# Patient Record
Sex: FEMALE | Race: WHITE
[De-identification: names, ages, dates, MRNs, and addresses within clinical notes are randomized per-mention and may not be internally consistent; named-entity substitution may affect disease eponyms.]

---

## 2018-01-10 ENCOUNTER — HOSPITAL ENCOUNTER (OUTPATIENT)
Dept: HOSPITAL 17 - NEPE | Age: 83
Setting detail: OBSERVATION
LOS: 3 days | Discharge: HOME | End: 2018-01-13
Attending: HOSPITALIST | Admitting: HOSPITALIST
Payer: MEDICARE

## 2018-01-10 VITALS — WEIGHT: 150.31 LBS | BODY MASS INDEX: 29.51 KG/M2 | HEIGHT: 60 IN

## 2018-01-10 VITALS
HEART RATE: 72 BPM | OXYGEN SATURATION: 96 % | SYSTOLIC BLOOD PRESSURE: 152 MMHG | RESPIRATION RATE: 18 BRPM | TEMPERATURE: 98 F | DIASTOLIC BLOOD PRESSURE: 69 MMHG

## 2018-01-10 VITALS
OXYGEN SATURATION: 94 % | TEMPERATURE: 99.3 F | DIASTOLIC BLOOD PRESSURE: 82 MMHG | RESPIRATION RATE: 18 BRPM | HEART RATE: 101 BPM | SYSTOLIC BLOOD PRESSURE: 140 MMHG

## 2018-01-10 VITALS
HEART RATE: 78 BPM | SYSTOLIC BLOOD PRESSURE: 147 MMHG | OXYGEN SATURATION: 99 % | DIASTOLIC BLOOD PRESSURE: 78 MMHG | RESPIRATION RATE: 18 BRPM

## 2018-01-10 DIAGNOSIS — F32.9: ICD-10-CM

## 2018-01-10 DIAGNOSIS — R56.9: ICD-10-CM

## 2018-01-10 DIAGNOSIS — N39.0: ICD-10-CM

## 2018-01-10 DIAGNOSIS — E78.00: ICD-10-CM

## 2018-01-10 DIAGNOSIS — R94.31: ICD-10-CM

## 2018-01-10 DIAGNOSIS — M46.90: ICD-10-CM

## 2018-01-10 DIAGNOSIS — Z85.42: ICD-10-CM

## 2018-01-10 DIAGNOSIS — Z79.899: ICD-10-CM

## 2018-01-10 DIAGNOSIS — R00.2: ICD-10-CM

## 2018-01-10 DIAGNOSIS — B96.89: ICD-10-CM

## 2018-01-10 DIAGNOSIS — Z79.01: ICD-10-CM

## 2018-01-10 DIAGNOSIS — G45.9: Primary | ICD-10-CM

## 2018-01-10 DIAGNOSIS — I45.10: ICD-10-CM

## 2018-01-10 DIAGNOSIS — F41.9: ICD-10-CM

## 2018-01-10 DIAGNOSIS — M81.0: ICD-10-CM

## 2018-01-10 DIAGNOSIS — Z86.73: ICD-10-CM

## 2018-01-10 DIAGNOSIS — K21.9: ICD-10-CM

## 2018-01-10 LAB
ALBUMIN SERPL-MCNC: 3.5 GM/DL (ref 3.4–5)
ALP SERPL-CCNC: 74 U/L (ref 45–117)
ALT SERPL-CCNC: 25 U/L (ref 10–53)
AST SERPL-CCNC: 20 U/L (ref 15–37)
BACTERIA #/AREA URNS HPF: (no result) /HPF
BASOPHILS # BLD AUTO: 0.1 TH/MM3 (ref 0–0.2)
BASOPHILS NFR BLD: 0.7 % (ref 0–2)
BILIRUB SERPL-MCNC: 0.3 MG/DL (ref 0.2–1)
BUN SERPL-MCNC: 17 MG/DL (ref 7–18)
CALCIUM SERPL-MCNC: 8.6 MG/DL (ref 8.5–10.1)
CHLORIDE SERPL-SCNC: 104 MEQ/L (ref 98–107)
COLOR UR: YELLOW
CREAT SERPL-MCNC: 0.75 MG/DL (ref 0.5–1)
EOSINOPHIL # BLD: 0.1 TH/MM3 (ref 0–0.4)
EOSINOPHIL NFR BLD: 1.2 % (ref 0–4)
ERYTHROCYTE [DISTWIDTH] IN BLOOD BY AUTOMATED COUNT: 13.7 % (ref 11.6–17.2)
GFR SERPLBLD BASED ON 1.73 SQ M-ARVRAT: 74 ML/MIN (ref 89–?)
GLUCOSE SERPL-MCNC: 99 MG/DL (ref 74–106)
GLUCOSE UR STRIP-MCNC: (no result) MG/DL
HCO3 BLD-SCNC: 29.2 MEQ/L (ref 21–32)
HCT VFR BLD CALC: 41.3 % (ref 35–46)
HGB BLD-MCNC: 14 GM/DL (ref 11.6–15.3)
HGB UR QL STRIP: (no result)
INR PPP: 1.6 RATIO
KETONES UR STRIP-MCNC: (no result) MG/DL
LYMPHOCYTES # BLD AUTO: 1.3 TH/MM3 (ref 1–4.8)
LYMPHOCYTES NFR BLD AUTO: 16.2 % (ref 9–44)
MCH RBC QN AUTO: 30 PG (ref 27–34)
MCHC RBC AUTO-ENTMCNC: 34 % (ref 32–36)
MCV RBC AUTO: 88.4 FL (ref 80–100)
MONOCYTE #: 0.8 TH/MM3 (ref 0–0.9)
MONOCYTES NFR BLD: 10.3 % (ref 0–8)
NEUTROPHILS # BLD AUTO: 5.6 TH/MM3 (ref 1.8–7.7)
NEUTROPHILS NFR BLD AUTO: 71.6 % (ref 16–70)
NITRITE UR QL STRIP: (no result)
PLATELET # BLD: 229 TH/MM3 (ref 150–450)
PMV BLD AUTO: 7.7 FL (ref 7–11)
PROT SERPL-MCNC: 7 GM/DL (ref 6.4–8.2)
PROTHROMBIN TIME: 16.5 SEC (ref 9.8–11.6)
RBC # BLD AUTO: 4.67 MIL/MM3 (ref 4–5.3)
RENAL EPI CELLS #/AREA URNS HPF: 1 /HPF
SODIUM SERPL-SCNC: 140 MEQ/L (ref 136–145)
SP GR UR STRIP: 1.01 (ref 1–1.03)
SQUAMOUS #/AREA URNS HPF: 4 /HPF (ref 0–5)
TROPONIN I SERPL-MCNC: (no result) NG/ML (ref 0.02–0.05)
URINE LEUKOCYTE ESTERASE: (no result)
WBC # BLD AUTO: 7.8 TH/MM3 (ref 4–11)

## 2018-01-10 PROCEDURE — 96365 THER/PROPH/DIAG IV INF INIT: CPT

## 2018-01-10 PROCEDURE — 85610 PROTHROMBIN TIME: CPT

## 2018-01-10 PROCEDURE — 84439 ASSAY OF FREE THYROXINE: CPT

## 2018-01-10 PROCEDURE — 85730 THROMBOPLASTIN TIME PARTIAL: CPT

## 2018-01-10 PROCEDURE — 70544 MR ANGIOGRAPHY HEAD W/O DYE: CPT

## 2018-01-10 PROCEDURE — 92522 EVALUATE SPEECH PRODUCTION: CPT

## 2018-01-10 PROCEDURE — 96372 THER/PROPH/DIAG INJ SC/IM: CPT

## 2018-01-10 PROCEDURE — 84443 ASSAY THYROID STIM HORMONE: CPT

## 2018-01-10 PROCEDURE — 82948 REAGENT STRIP/BLOOD GLUCOSE: CPT

## 2018-01-10 PROCEDURE — 80053 COMPREHEN METABOLIC PANEL: CPT

## 2018-01-10 PROCEDURE — 83036 HEMOGLOBIN GLYCOSYLATED A1C: CPT

## 2018-01-10 PROCEDURE — 70553 MRI BRAIN STEM W/O & W/DYE: CPT

## 2018-01-10 PROCEDURE — 87086 URINE CULTURE/COLONY COUNT: CPT

## 2018-01-10 PROCEDURE — 99285 EMERGENCY DEPT VISIT HI MDM: CPT

## 2018-01-10 PROCEDURE — 85652 RBC SED RATE AUTOMATED: CPT

## 2018-01-10 PROCEDURE — 97165 OT EVAL LOW COMPLEX 30 MIN: CPT

## 2018-01-10 PROCEDURE — 84425 ASSAY OF VITAMIN B-1: CPT

## 2018-01-10 PROCEDURE — 84484 ASSAY OF TROPONIN QUANT: CPT

## 2018-01-10 PROCEDURE — 84207 ASSAY OF VITAMIN B-6: CPT

## 2018-01-10 PROCEDURE — 97116 GAIT TRAINING THERAPY: CPT

## 2018-01-10 PROCEDURE — 95819 EEG AWAKE AND ASLEEP: CPT

## 2018-01-10 PROCEDURE — A9579 GAD-BASE MR CONTRAST NOS,1ML: HCPCS

## 2018-01-10 PROCEDURE — 80061 LIPID PANEL: CPT

## 2018-01-10 PROCEDURE — 93005 ELECTROCARDIOGRAM TRACING: CPT

## 2018-01-10 PROCEDURE — 70450 CT HEAD/BRAIN W/O DYE: CPT

## 2018-01-10 PROCEDURE — 86038 ANTINUCLEAR ANTIBODIES: CPT

## 2018-01-10 PROCEDURE — 85025 COMPLETE CBC W/AUTO DIFF WBC: CPT

## 2018-01-10 PROCEDURE — 97110 THERAPEUTIC EXERCISES: CPT

## 2018-01-10 PROCEDURE — 81001 URINALYSIS AUTO W/SCOPE: CPT

## 2018-01-10 PROCEDURE — G0378 HOSPITAL OBSERVATION PER HR: HCPCS

## 2018-01-10 PROCEDURE — 93306 TTE W/DOPPLER COMPLETE: CPT

## 2018-01-10 PROCEDURE — 86039 ANTINUCLEAR ANTIBODIES (ANA): CPT

## 2018-01-10 PROCEDURE — 93880 EXTRACRANIAL BILAT STUDY: CPT

## 2018-01-10 PROCEDURE — 97163 PT EVAL HIGH COMPLEX 45 MIN: CPT

## 2018-01-10 NOTE — PD
HPI


Chief Complaint:  Neuro Symptoms/ Deficits


Time Seen by Provider:  19:22


Travel History


International Travel<30 days:  No


Contact w/Intl Traveler<30days:  No


Traveled to known affect area:  No





History of Present Illness


HPI


82-year-old female that presents to the ED for evaluation of possible TIA.  

Patient has a history of TIAs in the past.  Per patient she last had one in 

.  Per patient she's noted as well as daughter that she's been having 

episodes of confusion as well as trouble speaking and coming up with words to 

come and go.  She's had a couple of episodes after Christmases but they noted 

them more yesterday and today when she apparently tried to text her daughter 

and she couldn't finish the text as well as today when she was playing "

solitary "on her phone and she couldn't figure out how to play it at that she 

plays this daily and at the same time could not speak with his daughter.  She 

does report that on December the.  She had an episode which she was reading a 

book and on one of her eye she could not read the letter and only part of it.  

Per patient she is compliant with her Coumadin which she is being prescribed to 

her doctor.  She does have a history of high blood pressure.  She denies any 

history of diabetes.  No chest pain or shortness of breath.  No urinary or 

bowel movement issues.  She has allergies to quinine.  She follows with Dr. Strange for neurology but she couldn't get to talk to him on the phone so they 

came here.





PFSH


Past Medical History


Arthritis:  Yes (LOW SPINAL ARTHRITIS)


Anxiety:  Yes


Depression:  Yes


Cancer:  Yes (ENDOMETRIUM  RADIATION REQ'D NO CHEMO)


Cardiovascular Problems:  Yes (PALPITATIONS IF UNDER EXTREME STRESS )


Diabetes:  No


Endocrine:  No


Gastrointestinal Disorders:  Yes (OCCAS ACID REFLUX; HX GI BLEED  )


Genitourinary:  No


Hepatitis:  No


Hiatal Hernia:  No


Immune Disorder:  No


Implanted Vascular Access Dvce:  Yes


Musculoskeletal:  Yes (MULTIPLE PELVIC STRESS FRACTURES, [id number] )


Neurologic:  Yes (GRAND MAL SEIZURE 2014 ? FROM DEHYDRATION OR ANXIETY )


Psychiatric:  Yes (ANXIETY )


Reproductive:  Yes (HX OF ENDOMETRIAL CANCER)


Respiratory:  No


Radiation Therapy:  Yes


Thyroid Disease:  No


Tetanus Vaccination:  > 5 Years


Menopausal:  No


Tubal Ligation:  Yes ()





Past Surgical History


Abdominal Surgery:  Yes (CYST ON LIVER DRAINED; DILAN  )


AICD:  No


Appendectomy:  Yes ()


Body Medical Devices:  1 SURGICAL WIRE SUTURE - JAW


Cardiac Surgery:  No


 Section:  Yes ()


Ear Surgery:  No


Endocrine Surgery:  No


Eye Surgery:  Yes (CATARACT RIGHT )


Genitourinary Surgery:  No


Gynecologic Surgery:  Yes (TOTAL HYSTERECTOMY; C SECTION )


Hysterectomy:  Yes ()


Joint Replacement:  No


Oral Surgery:  Yes (JAW SURGERY-SEVERE UNDERBITE)


Pacemaker:  No


Thoracic Surgery:  No


Other Surgery:  Yes (JAW SURGERIES IN 1960S)





Social History


Alcohol Use:  No


Tobacco Use:  No


Substance Use:  No





Allergies-Medications


(Allergen,Severity, Reaction):  


Coded Allergies:  


     quinine (Unverified  Allergy, Severe, 8/15/17)


 FACIAL BUTTERFLY RASH


Reported Meds & Prescriptions





Reported Meds & Active Scripts


Active


Reported


Warfarin 2 Mg Tab 2 Mg PO DAILY


Multiple Vitamin/Minerals (Multiple Vitamins W/ Minerals) 1 Tab Tab   


Clonazepam 0.5 Mg Tab 0.5 Mg PO HS


Pravastatin 20 Mg Tab 20 Mg PO DAILY


Duloxetine DR (Duloxetine HCl) 30 Mg Capdr 30 Mg PO DAILY


Acetaminophen Extra Strength Liq (Acetaminophen) 500 Mg/15 Ml Soln 500 Mg PO Q4-

6H PRN








Review of Systems


Except as stated in HPI:  all other systems reviewed are Neg





Physical Exam


Narrative


GENERAL: 


SKIN: Warm and dry.


HEAD: Atraumatic. Normocephalic. 


EYES: Pupils equal and round. No scleral icterus. No injection or drainage. 


ENT: No nasal bleeding or discharge.  Mucous membranes pink and moist.  Tongue 

is midline.  No uvula deviation.


NECK: Trachea midline. No JVD. 


CARDIOVASCULAR: Regular rate and rhythm.  No murmurs, S3, S4.


RESPIRATORY: No accessory muscle use. Clear to auscultation. Breath sounds 

equal bilaterally. 


GASTROINTESTINAL: Abdomen soft, non-tender, nondistended. Hepatic and splenic 

margins not palpable. 


MUSCULOSKELETAL: Extremities without clubbing, cyanosis, or edema. No obvious 

deformities.  Full range of motion of the upper and lower extremities 

bilaterally.  2+ pulses bilaterally.  Romberg is negative.  pronator test is 

negative.


NEUROLOGICAL: Awake and alert. No obvious cranial nerve deficits.  Motor 

grossly within normal limits. Five out of 5 muscle strength in the arms and 

legs.  Normal speech.


PSYCHIATRIC: Appropriate mood and affect; insight and judgment normal.





Data


Data


Last Documented VS





Vital Signs








  Date Time  Temp Pulse Resp B/P (MAP) Pulse Ox O2 Delivery O2 Flow Rate FiO2


 


1/10/18 19:21     96 Room Air  


 


1/10/18 14:36 99.3 101 18 140/82 (101)    








Orders





 Orders


Electrocardiogram (1/10/18 15:00)


Prothrombin Time / Inr (Pt) (1/10/18 15:00)


Act Partial Throm Time (Ptt) (1/10/18 15:00)


Complete Blood Count With Diff (1/10/18 15:00)


Comprehensive Metabolic Panel (1/10/18 15:00)


Troponin I (1/10/18 15:00)


Urinalysis - C+S If Indicated (1/10/18 15:00)


Ct Brain W/O Iv Contrast(Rout) (1/10/18 15:00)


Urine Culture (1/10/18 15:35)


Admit Order (Ed Use Only) (1/10/18 20:28)





Labs





Laboratory Tests








Test


  1/10/18


15:35


 


White Blood Count 7.8 TH/MM3 


 


Red Blood Count 4.67 MIL/MM3 


 


Hemoglobin 14.0 GM/DL 


 


Hematocrit 41.3 % 


 


Mean Corpuscular Volume 88.4 FL 


 


Mean Corpuscular Hemoglobin 30.0 PG 


 


Mean Corpuscular Hemoglobin


Concent 34.0 % 


 


 


Red Cell Distribution Width 13.7 % 


 


Platelet Count 229 TH/MM3 


 


Mean Platelet Volume 7.7 FL 


 


Neutrophils (%) (Auto) 71.6 % 


 


Lymphocytes (%) (Auto) 16.2 % 


 


Monocytes (%) (Auto) 10.3 % 


 


Eosinophils (%) (Auto) 1.2 % 


 


Basophils (%) (Auto) 0.7 % 


 


Neutrophils # (Auto) 5.6 TH/MM3 


 


Lymphocytes # (Auto) 1.3 TH/MM3 


 


Monocytes # (Auto) 0.8 TH/MM3 


 


Eosinophils # (Auto) 0.1 TH/MM3 


 


Basophils # (Auto) 0.1 TH/MM3 


 


CBC Comment DIFF FINAL 


 


Differential Comment  


 


Prothrombin Time 16.5 SEC 


 


Prothromb Time International


Ratio 1.6 RATIO 


 


 


Activated Partial


Thromboplast Time 30.6 SEC 


 


 


Urine Color YELLOW 


 


Urine Turbidity HAZY 


 


Urine pH 7.0 


 


Urine Specific Gravity 1.012 


 


Urine Protein NEG mg/dL 


 


Urine Glucose (UA) NEG mg/dL 


 


Urine Ketones NEG mg/dL 


 


Urine Occult Blood TRACE 


 


Urine Nitrite NEG 


 


Urine Bilirubin NEG 


 


Urine Urobilinogen


  LESS THAN 2.0


MG/DL


 


Urine Leukocyte Esterase LARGE 


 


Urine RBC 2 /hpf 


 


Urine WBC 27 /hpf 


 


Urine Squamous Epithelial


Cells 4 /hpf 


 


 


Urine Renal Epithelial Cells 1 /hpf 


 


Urine Bacteria OCC /hpf 


 


Microscopic Urinalysis Comment


  CATH-CULTURE


IND


 


Blood Urea Nitrogen 17 MG/DL 


 


Creatinine 0.75 MG/DL 


 


Random Glucose 99 MG/DL 


 


Total Protein 7.0 GM/DL 


 


Albumin 3.5 GM/DL 


 


Calcium Level 8.6 MG/DL 


 


Alkaline Phosphatase 74 U/L 


 


Aspartate Amino Transf


(AST/SGOT) 20 U/L 


 


 


Alanine Aminotransferase


(ALT/SGPT) 25 U/L 


 


 


Total Bilirubin 0.3 MG/DL 


 


Sodium Level 140 MEQ/L 


 


Potassium Level 4.1 MEQ/L 


 


Chloride Level 104 MEQ/L 


 


Carbon Dioxide Level 29.2 MEQ/L 


 


Anion Gap 7 MEQ/L 


 


Estimat Glomerular Filtration


Rate 74 ML/MIN 


 


 


Troponin I


  LESS THAN 0.02


NG/ML











MDM


Medical Decision Making


Medical Screen Exam Complete:  Yes


Emergency Medical Condition:  Yes


Medical Record Reviewed:  Yes


Interpretation(s)


CBC & BMP Diagram


1/10/18 15:35








Total Protein 7.0, Albumin 3.5, Calcium Level 8.6, Alkaline Phosphatase 74, 

Aspartate Amino Transf (AST/SGOT) 20, Alanine Aminotransferase (ALT/SGPT) 25, 

Total Bilirubin 0.3








Last Impressions








Head CT 1/10/18 1500 Signed





Impressions: 





 Service Date/Time:  Wednesday, January 10, 2018 15:22 - CONCLUSION:  No acute 





 intracranial abnormality.     Kieran Graves MD 





EKG shows sinus rhythm with no sign of acute ischemia or arrhythmia.


UA showed leukocyte esterase and someone will cells and bacteria but no 

nitrites.


Differential Diagnosis


CVA versus TIA versus anxiety versus confusion versus normal exam


Narrative Course


82-year-old female that presents to the ED for evaluation of possible TIA.  

Patient was properly examined and was found to have signs and symptoms 

consistent appears to be likely TIA.  Labs and imaging were ordered.  Labs and 

imaging were essentially unremarkable.  Definite concerning that she's had 

multiple episodes of unexplained neurological deficits.  Cannot completely rule 

out TIA.  Patient has not had any workup for about 2 years.  I do recommend 

admission for further evaluation of this as patient already takes 

anticoagulation and she continues to have the symptoms.  Case discussed in my 

attending Dr. Galeano who agrees the patient should be admitted.  This was 

discussed with the patient and agrees to admission.  Case was discussed with 

Dr. Ross who agrees to admission.





Diagnosis





 Primary Impression:  


 TIA (transient ischemic attack)


 Qualified Codes:  G45.9 - Transient cerebral ischemic attack, unspecified





Admitting Information


Admitting Physician Requests:  Observation











Codey Sawyer Luis 10, 2018 20:34

## 2018-01-10 NOTE — RADRPT
EXAM DATE/TIME:  01/10/2018 15:22 

 

[facility] COMPARISON:     

CT BRAIN W/O CONTRAST, May 02, 2016, 7:51.

 

 

INDICATIONS :     

Episode of confusion, visual disturbances. 

                      

 

RADIATION DOSE:     

34.66 CTDIvol (mGy) 

 

 

 

MEDICAL HISTORY :       

Endometrial cancer

 

SURGICAL HISTORY :      

Hysterectomy. 

 

ENCOUNTER:      

Initial

 

ACUITY:      

1 day

 

PAIN SCALE:      

0/10

 

LOCATION:        

cranial 

 

TECHNIQUE:     

Multiple contiguous axial images were obtained of the head.  Using automated exposure control and adj
ustment of the mA and/or kV according to patient size, radiation dose was kept as low as reasonably a
chievable to obtain optimal diagnostic quality images.   DICOM format image data is available electro
nically for review and comparison.  

 

FINDINGS:     

 

CEREBRUM:     

The ventricles are normal for age.  No evidence of midline shift, mass lesion, hemorrhage or acute in
farction.  No extra-axial fluid collections are seen.

 

POSTERIOR FOSSA:     

The cerebellum and brainstem are intact.  The 4th ventricle is midline.  The cerebellopontine angle i
s unremarkable.

 

EXTRACRANIAL:     

The visualized portion of the orbits is intact.

 

SKULL:     

The calvaria is intact.  No evidence of skull fracture.

 

CONCLUSION:     

No acute intracranial abnormality.

 

 

 

 Kieran Graves MD on January 10, 2018 at 15:26           

Board Certified Radiologist.

 This report was verified electronically.

## 2018-01-11 VITALS
TEMPERATURE: 98 F | OXYGEN SATURATION: 96 % | RESPIRATION RATE: 18 BRPM | HEART RATE: 72 BPM | SYSTOLIC BLOOD PRESSURE: 128 MMHG | DIASTOLIC BLOOD PRESSURE: 65 MMHG

## 2018-01-11 VITALS
RESPIRATION RATE: 18 BRPM | DIASTOLIC BLOOD PRESSURE: 62 MMHG | OXYGEN SATURATION: 94 % | TEMPERATURE: 98 F | HEART RATE: 78 BPM | SYSTOLIC BLOOD PRESSURE: 122 MMHG

## 2018-01-11 VITALS
HEART RATE: 70 BPM | DIASTOLIC BLOOD PRESSURE: 69 MMHG | TEMPERATURE: 97.8 F | RESPIRATION RATE: 19 BRPM | OXYGEN SATURATION: 96 % | SYSTOLIC BLOOD PRESSURE: 152 MMHG

## 2018-01-11 VITALS
OXYGEN SATURATION: 95 % | RESPIRATION RATE: 16 BRPM | HEART RATE: 77 BPM | DIASTOLIC BLOOD PRESSURE: 69 MMHG | SYSTOLIC BLOOD PRESSURE: 144 MMHG | TEMPERATURE: 97.9 F

## 2018-01-11 VITALS
DIASTOLIC BLOOD PRESSURE: 68 MMHG | TEMPERATURE: 96.4 F | HEART RATE: 68 BPM | RESPIRATION RATE: 16 BRPM | OXYGEN SATURATION: 95 % | SYSTOLIC BLOOD PRESSURE: 144 MMHG

## 2018-01-11 VITALS
DIASTOLIC BLOOD PRESSURE: 65 MMHG | OXYGEN SATURATION: 95 % | HEART RATE: 81 BPM | SYSTOLIC BLOOD PRESSURE: 120 MMHG | RESPIRATION RATE: 16 BRPM | TEMPERATURE: 98.1 F

## 2018-01-11 VITALS — DIASTOLIC BLOOD PRESSURE: 62 MMHG | SYSTOLIC BLOOD PRESSURE: 127 MMHG

## 2018-01-11 VITALS — HEART RATE: 76 BPM

## 2018-01-11 VITALS — HEART RATE: 82 BPM

## 2018-01-11 VITALS — HEART RATE: 70 BPM

## 2018-01-11 LAB
HBA1C MFR BLD: 5.4 % (ref 4.3–6)
INR PPP: 1.6 RATIO
PROTHROMBIN TIME: 16.3 SEC (ref 9.8–11.6)
T4 FREE SERPL-MCNC: 1.08 NG/DL (ref 0.76–1.46)

## 2018-01-11 RX ADMIN — INSULIN ASPART SCH: 100 INJECTION, SOLUTION INTRAVENOUS; SUBCUTANEOUS at 21:00

## 2018-01-11 RX ADMIN — HEPARIN SODIUM SCH UNITS: 10000 INJECTION, SOLUTION INTRAVENOUS; SUBCUTANEOUS at 10:03

## 2018-01-11 RX ADMIN — DULOXETINE HYDROCHLORIDE SCH MG: 30 CAPSULE, DELAYED RELEASE ORAL at 10:04

## 2018-01-11 RX ADMIN — WARFARIN SODIUM SCH MG: 6 TABLET ORAL at 17:15

## 2018-01-11 RX ADMIN — Medication SCH ML: at 10:04

## 2018-01-11 RX ADMIN — INSULIN ASPART SCH: 100 INJECTION, SOLUTION INTRAVENOUS; SUBCUTANEOUS at 08:00

## 2018-01-11 RX ADMIN — INSULIN ASPART SCH: 100 INJECTION, SOLUTION INTRAVENOUS; SUBCUTANEOUS at 12:00

## 2018-01-11 RX ADMIN — Medication SCH ML: at 21:00

## 2018-01-11 RX ADMIN — ASPIRIN SCH MG: 325 TABLET ORAL at 10:03

## 2018-01-11 RX ADMIN — PRAVASTATIN SODIUM SCH MG: 20 TABLET ORAL at 10:04

## 2018-01-11 RX ADMIN — INSULIN ASPART SCH: 100 INJECTION, SOLUTION INTRAVENOUS; SUBCUTANEOUS at 17:00

## 2018-01-11 RX ADMIN — HEPARIN SODIUM SCH UNITS: 10000 INJECTION, SOLUTION INTRAVENOUS; SUBCUTANEOUS at 23:48

## 2018-01-11 NOTE — MG
cc:

MARAL CATALAN MD

****

 

 

Lab No:  Date:  1/11/18 Age:      Sex:  F Race:

 

DATE OF BIRTH

1935

 

REFERRING PHYSICIAN

Dr. Gudino

 

MEDICAL HISTORY

Cataract of the right eye, osteoarthritis, depression, arthritis, back problems
, anxiety, cancer, radiation, transient ischemic attack,  GI bleed, 
gastroesophageal reflux disease,  seizures admitted for possible TIA.  The 
patient was having episodes of confusion, trouble speaking. 

 

MEDICATIONS

Coumadin

Cymbalta

Temazepam

 

DESCRIPTION

Background activity is 8-9 Hz alpha located posteriorly, attenuates  to eye-
opening with posterior to anterior  gradient bilateral and symmetrical. During 
the recording, there is eye blinking and movement artifact. Hyperventilation 
was omitted.  Photic stimulation did not elicit a driving response.  During the 
recording, there was left temporal electrode artifact.

There were no electrographic seizures or epileptiform discharges noted. 



INTERPRETATION

This is a normal awake EEG.  The EEG is contaminated with muscle artifacts and 
eye movements artifact.  There were no electrographic seizures or epileptiform 
discharges noted during the body.  Clinical correlation is recommended.

 

                              _________________________________

                              MD CABRERA Palmer/

D:  1/11/2018/7:21 PM

T:  1/11/2018/8:08 PM

Visit #:  [id number]

Job #:  [id number]

MTDROGER

## 2018-01-11 NOTE — RADRPT
EXAM DATE/TIME:  01/11/2018 11:10 

 

[facility] COMPARISON:     

CT BRAIN W/O CONTRAST, January 10, 2018, 15:22.

       

 

 

INDICATIONS :     

Cerebrovascular accident. Confusion and visual disturbance.

                     

 

CONTRAST:     

13 cc Omniscan (gadodiamide) IV

                     

 

MEDICAL HISTORY :     

Arthritis. Gastroesophageal reflux disease.   endometrial cancer

 

SURGICAL HISTORY :     

Hysterectomy. Appendectomy.   jaw, cataract

 

ENCOUNTER:     

Subsequent

 

ACUITY:     

2 day

 

PAIN SCORE:     

0/10

 

LOCATION:       

cranial 

 

TECHNIQUE:     

Multiplanar, multisequence MRI of the brain was performed both prior to and following the administrat
ion of paramagnetic contrast.

 

FINDINGS:     

 

CEREBRUM:     

The ventricles are normal for age.  No evidence of midline shift, mass lesion, hemorrhage or acute in
farction.  No extraaxial fluid collections are seen.  The pituitary gland and suprasellar cistern are
 normal in configuration.

 

WHITE MATTER:     

On the flair weighted images and increased signal in the periventricular white matter and centrum yelitza
iovale.

 

POSTERIOR FOSSA:     

The cerebellum and brainstem are intact.  The 4th ventricle is midline. The cerebellopontine angle is
 unremarkable.  The cerebellar tonsils are normal in position.

 

DIFFUSION IMAGING:     

No focal areas of restricted diffusion are seen.  No evidence of acute infarction.

 

EXTRACRANIAL:     

The visualized portions of the orbits and paranasal sinuses are unremarkable.

 

POST-CONTRAST:     

No abnormal areas of parenchymal or dural enhancement.  No evidence of blood-brain barrier breakdown.


 

CONCLUSION:     

1. No acute hemorrhage, mass or infarction.

2. Mild atrophy and chronic small vessel ischemic change.

 

 

 

 Manolo Atkinson MD on January 11, 2018 at 13:37           

Board Certified Radiologist.

 This report was verified electronically.

## 2018-01-11 NOTE — RADRPT
EXAM DATE/TIME:  01/11/2018 11:10 

 

[facility] COMPARISON:     

No previous studies available for comparison.

       

 

 

INDICATIONS :     

Cerebrovascular accident.

                     

 

MEDICAL HISTORY :     

Arthritis. Gastroesophageal reflux disease.   Endometrial cancer

 

SURGICAL HISTORY :     

Hysterectomy. Appendectomy.   jaw, cataract

 

ENCOUNTER:     

Subsequent

 

ACUITY:     

2 day

 

PAIN SCORE:     

0/10

 

LOCATION:       

cranial 

 

Please note a normal MRA of the brain does not entirely exclude the possibility of a small aneurysm, 


nor the possibility of distal intracranial vessel disease.

 

TECHNIQUE:     

3D time of flight MRA was performed.  Source images, multiplanar STS MIP, and 3D volume MIP reconstru
ctions were reviewed.

 

FINDINGS:     

 

Anterior circulation: 

Distal intracranial internal carotid arteries are patent with flow extending to the middle and anteri
or cerebral arteries. There is no evidence for aneurysm, vessel truncation or stenosis, and no eviden
ce for vascular malformation.

 

Posterior circulation: 

Symmetric distal vertebral arteries with flow extending to basilar artery.  Fetal origin of the right
 PCA. There is no evidence for aneurysm, vessel truncation or stenosis, and no evidence for vascular 
malformation.

 

CONCLUSION:     

1. Unremarkable MRA examination of the brain. Specifically, no evidence for large vessel occlusion or
 significant cerebral artery stenosis.

 

 

 

 Hema Garcia MD on January 11, 2018 at 13:07           

Board Certified Radiologist.

 This report was verified electronically.

## 2018-01-11 NOTE — HHI.HP
__________________________________________________





[facility]


Service


[facility]


.


Primary Care Physician


Ramy Bernabe MD


.


Admission Diagnosis





acute TIA 


.


Diagnoses:  


(1) TIA (transient ischemic attack)


Chief Complaint:  


Word finding difficulty, inability to play solitaire effectively


Travel History


International Travel<30 Days:  No


Contact w/Intl Traveler <30 Da:  No


Traveled to Known Affected Are:  No


History of Present Illness


Ms. Corona is a very pleasant 82 year-old female with a history of seizure, 

TIA, endometrial cancer status post DILAN and radiation, osteoporosis with 

spontaneous pelvic fractures, and cataracts who presented to the emergency room 

on 1/10/2018 for evaluation of TIA symptoms.  Head CT in the ER with no acute 

intracranial abnormality.  





The patient is seen in the CDU.  The patient reports that she volunteered at 

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  She went home and 

sat down to play solitaire and noted that she was having difficulty matching 

cards.  She started to try to send text messages to her daughter and had 

difficulty spelling words and finishing her message.  She has similar episode 

to this about 3 years ago with a TIA followed by a grand mal seizure.  She sees 

Dr. Holt and takes clonazepam 0.5 mg daily at bedtime for seizure prevention 

along with restless legs syndrome.  She's had no additional seizures since the 

one 3 years ago.  She had a cardiology workup at that time that showed PAT.  

She states they were never able to find any atrial fibrillation but she was 

recommended to start Coumadin for anticoagulation.  Upon reviewing Dr. Pierce's 

note dated 5/3/2016, he recommended Coumadin long-term since the patient's 

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 

patient on Plavix.





The patient denies any unilateral weakness, facial drooping, or headache.  She 

denies any recent cough, cold, or flu symptoms.  She denies any recent fevers 

or chills though she was trying to send her daughter a text message saying "I 

am trying to get warm" earlier on 1/10/2018.  Her last visit with Dr. Holt 

was one year ago and her last visit with Dr. Virk was 1 year ago also.  She 

has new finding of right bundle branch block on 12-lead EKG done in the ED not 

noted on prior 12-lead EKGs.





Review of Systems


Except as stated in HPI:  all other systems reviewed are Neg





Past Family Social History


Past Medical History


Grand mal seizure


TIA


Endometrial cancer status post DILAN with node dissection and radiation treatment 

in 


Restless leg syndrome


Osteoporosis with spontaneous pelvic fractures in 





Denies diabetes mellitus, hypertension, coronary artery disease, asthma, 

emphysema, liver problems such as hepatitis, kidney disease, DVT, PE, CVA, or 

thyroid problems.


.


Past Surgical History


 


Total abdominal hysterectomy with node dissection 


Jaw surgery to reduce severe underbite with tendon ligation


Cataract surgery


BTL 


Drainage of liver cyst


.


Reported Medications





Reported Meds & Active Scripts


Active


Reported


Warfarin 2 Mg Tab 2 Mg PO DAILY


Multiple Vitamin/Minerals (Multiple Vitamins W/ Minerals) 1 Tab Tab   


Clonazepam 0.5 Mg Tab 0.5 Mg PO HS


Pravastatin 20 Mg Tab 20 Mg PO DAILY


Duloxetine DR (Duloxetine HCl) 30 Mg Capdr 30 Mg PO DAILY


Acetaminophen Extra Strength Liq (Acetaminophen) 500 Mg/15 Ml Soln 500 Mg PO Q4-

6H PRN


.


Allergies:  


Coded Allergies:  


     quinine (Unverified  Allergy, Severe, 8/15/17)


 FACIAL BUTTERFLY RASH


Active Ordered Medications





Last Impressions








Head CT 1/10/18 1500 Signed





Impressions: 





 Service Date/Time:  Wednesday, January 10, 2018 15:22 - CONCLUSION:  No acute 





 intracranial abnormality.     Kieran Graves MD 





.


Family History


Mother had a stroke, diabetes mellitus, and chronic kidney disease


Brother with cerebral hemorrhage and another brother with Crohn's disease


.


Social History


Tobacco: Denies ever smoking


Alcohol: Denies


Illicit Drugs: Denies





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 

instructor.


.





Physical Exam


Vital Signs





Vital Signs








  Date Time  Temp Pulse Resp B/P (MAP) Pulse Ox O2 Delivery O2 Flow Rate FiO2


 


18 00:13 97.8 70 19 152/69 (96) 96   


 


1/10/18 22:40 98.0 72 18 152/69 (96) 96   


 


1/10/18 20:32  78 18 147/78 (101) 99 Room Air  


 


1/10/18 19:21     96 Room Air  


 


1/10/18 14:36 99.3 101 18 140/82 (101) 94 Room Air  








Physical Exam





GENERAL: This is a very pleasant female patient, in no apparent distress.


SKIN: No rashes, ecchymoses or lesions. Cool and dry.


HEAD: Atraumatic. Normocephalic. 


EYES: No scleral icterus. No injection or drainage. 


ENT: Nose without bleeding, purulent drainage. 


NECK: Trachea midline. No JVD or lymphadenopathy. 


CARDIOVASCULAR: Regular rate and rhythm without murmurs, gallops, or rubs. 


RESPIRATORY: Clear to auscultation. Breath sounds equal bilaterally. No wheezes

, rales, or rhonchi.  


GASTROINTESTINAL: Abdomen soft, non-tender, nondistended. No guarding.


MUSCULOSKELETAL: Extremities without clubbing, cyanosis, or edema. No calf 

tenderness.  Strength equal bilaterally.


NEUROLOGICAL: Awake and alert. Motor and sensory grossly within normal limits. 

Normal speech.  No focal neuro deficits ordered, CN II - XII grossly intact.


.


Laboratory





Laboratory Tests








Test


  1/10/18


15:35


 


White Blood Count 7.8 


 


Red Blood Count 4.67 


 


Hemoglobin 14.0 


 


Hematocrit 41.3 


 


Mean Corpuscular Volume 88.4 


 


Mean Corpuscular Hemoglobin 30.0 


 


Mean Corpuscular Hemoglobin


Concent 34.0 


 


 


Red Cell Distribution Width 13.7 


 


Platelet Count 229 


 


Mean Platelet Volume 7.7 


 


Neutrophils (%) (Auto) 71.6 


 


Lymphocytes (%) (Auto) 16.2 


 


Monocytes (%) (Auto) 10.3 


 


Eosinophils (%) (Auto) 1.2 


 


Basophils (%) (Auto) 0.7 


 


Neutrophils # (Auto) 5.6 


 


Lymphocytes # (Auto) 1.3 


 


Monocytes # (Auto) 0.8 


 


Eosinophils # (Auto) 0.1 


 


Basophils # (Auto) 0.1 


 


CBC Comment DIFF FINAL 


 


Differential Comment  


 


Prothrombin Time 16.5 


 


Prothromb Time International


Ratio 1.6 


 


 


Activated Partial


Thromboplast Time 30.6 


 


 


Urine Color YELLOW 


 


Urine Turbidity HAZY 


 


Urine pH 7.0 


 


Urine Specific Gravity 1.012 


 


Urine Protein NEG 


 


Urine Glucose (UA) NEG 


 


Urine Ketones NEG 


 


Urine Occult Blood TRACE 


 


Urine Nitrite NEG 


 


Urine Bilirubin NEG 


 


Urine Urobilinogen LESS THAN 2.0 


 


Urine Leukocyte Esterase LARGE 


 


Urine RBC 2 


 


Urine WBC 27 


 


Urine Squamous Epithelial


Cells 4 


 


 


Urine Renal Epithelial Cells 1 


 


Urine Bacteria OCC 


 


Microscopic Urinalysis Comment


  CATH-CULTURE


IND


 


Blood Urea Nitrogen 17 


 


Creatinine 0.75 


 


Random Glucose 99 


 


Total Protein 7.0 


 


Albumin 3.5 


 


Calcium Level 8.6 


 


Alkaline Phosphatase 74 


 


Aspartate Amino Transf


(AST/SGOT) 20 


 


 


Alanine Aminotransferase


(ALT/SGPT) 25 


 


 


Total Bilirubin 0.3 


 


Sodium Level 140 


 


Potassium Level 4.1 


 


Chloride Level 104 


 


Carbon Dioxide Level 29.2 


 


Anion Gap 7 


 


Estimat Glomerular Filtration


Rate 74 


 


 


Troponin I LESS THAN 0.02 














 Date/Time


Source Procedure


Growth Status


 


 


 1/10/18 15:35


Urine Catheterized Urine Urine Culture


Pending Received








Result Diagram:  


1/10/18 1535                                                                   

             1/10/18 1535





Imaging





Last Impressions








Head CT 1/10/18 1500 Signed





Impressions: 





 Service Date/Time:  Wednesday, January 10, 2018 15:22 - CONCLUSION:  No acute 





 intracranial abnormality.     Kieran Graves MD 





.





[place] VTE Risk Assessment


[place] VTE Risk Assessment:  Mod/High Risk (score >= 2)


[place]ini Risk Assessment Model











 Point Value = 1          Point Value = 2  Point Value = 3  Point Value = 5


 


Age 41-60


Minor surgery


BMI > 25 kg/m2


Swollen legs


Varicose veins


Pregnancy or postpartum


History of unexplained or recurrent


   spontaneous 


Oral contraceptives or hormone


   replacement


Sepsis (< 1 month)


Serious lung disease, including


   pneumonia (< 1 month)


Abnormal pulmonary function


Acute myocardial infarction


Congestive heart failure (< 1 month)


History of inflammatory bowel disease


Medical patient at bed rest Age 61-74


Arthroscopic surgery


Major open surgery (> 45 min)


Laparoscopic surgery (> 45 min)


Malignancy


Confined to bed (> 72 hours)


Immobilizing plaster cast


Central venous access Age >= 75


History of VTE


Family history of VTE


Factor V Leiden


Prothrombin [id number]


Lupus anticoagulant


Anticardiolipin antibodies


Elevated serum homocysteine


Heparin-induced thrombocytopenia


Other congenital or acquired


   thrombophilia Stroke (< 1 month)


Elective arthroplasty


Hip, pelvis, or leg fracture


Acute spinal cord injury (< 1 month)








Prophylaxis Regimen











   Total Risk


Factor Score Risk Level Prophylaxis Regimen


 


0-1      Low Early ambulation


 


2 Moderate Order ONE of the following:


*Sequential Compression Device (SCD)


*Heparin 5000 units SQ BID


 


3-4 Higher Order ONE of the following medications:


*Heparin 5000 units SQ TID


*Enoxaparin/Lovenox 40 mg SQ daily (WT < 150 kg, CrCl > 30 mL/min)


*Enoxaparin/Lovenox 30 mg SQ daily (WT < 150 kg, CrCl > 10-29 mL/min)


*Enoxaparin/Lovenox 30 mg SQ BID (WT < 150 kg, CrCl > 30 mL/min)


AND/OR


*Sequential Compression Device (SCD)


 


5 or more Highest Order ONE of the following medications:


*Heparin 5000 units SQ TID (Preferred with Epidurals)


*Enoxaparin/Lovenox 40 mg SQ daily (WT < 150 kg, CrCl > 30 mL/min)


*Enoxaparin/Lovenox 30 mg SQ daily (WT < 150 kg, CrCl > 10-29 mL/min)


*Enoxaparin/Lovenox 30 mg SQ BID (WT < 150 kg, CrCl > 30 mL/min)


AND


*Sequential Compression Device (SCD)











Assessment and Plan


Problem List:  


(1) TIA (transient ischemic attack)


ICD Code:  G45.9 - Transient cerebral ischemic attack, unspecified


Status:  Acute


(2) Right bundle branch block (RBBB)


ICD Code:  I45.10 - Unspecified right bundle-branch block


(3) History of seizure


ICD Code:  Z87.898 - Personal history of other specified conditions


Assessment and Plan


Ms. Corona is a very pleasant 82 year-old female with a history of seizure, 

TIA, endometrial cancer status post DILAN and radiation, osteoporosis with 

spontaneous pelvic fractures, and cataracts who presented to the emergency room 

on 1/10/2018 for evaluation of TIA symptoms.  Head CT in the ER with no acute 

intracranial abnormality.  





TIA suspected


- Head CT with no acute intracranial abnormality


- Patient with history of TIA on anticoagulation with Coumadin with INR 1.6


- Follows with Dr. Holt, neurology - we will consult with him


- Check brain MRI and MRA to rule out structural abnormalities/ischemic CVA


- Check carotid ultrasound to evaluate for carotid stenosis


- Echocardiogram to evaluate her neck structure and function


- Check hemoglobin A1c and lipid profile - to evaluate for diabetes and 

hyperlipidemia


- Frequent neuro checks


- Consult ST, OT, and PT


- NIH stroke scale daily


- Monitor vital signs and perform neuro checks frequently


- bedside swallow evaluation performed by nursing, patient passed - will start 

heart healthy diet





History of grand mal seizure 


- No seizure activity in past 3 years


- Klonopin 0.25 mg qhs for seizure prevention - will give a dose tonight and 

defer further dosing to neurology


- seizure precautions





New Right Bundle Branch Block


- Patient without any chest pain or shortness of breath


- Patient follows with Dr. Virk as an outpatient - will consult him





DVT prophylaxis


- continue Coumadin for now, follow PT/INR


.


Discussed Condition With


Patient, patient's RN, and Dr. Ross


.





Problem Qualifiers





(1) TIA (transient ischemic attack):  


Qualified Codes:  G45.9 - Transient cerebral ischemic attack, unspecified








Smita Medina 2018 03:59

## 2018-01-11 NOTE — PD.CONS
HPI


Service


Cardiology Physicians


Consult Requested By


JOON Guerrier


Reason for Consult


New RBBB


Primary Care Physician


Ramy Bernabe MD


History of Present Illness


The patient is an 82 year old female known to our practice with a cardiac 

history of CVA and TIAs on coumadin and HLD. The patient presented to the 

hospital for an acute episode of confusion while playing solitaire on the 

computer. She denies associated symptoms of weakness, palpitations, vision 

changes or slurred speech. INR on admission was subtherapeutic at 1.6. On 

admission, she was noted to have a new RBBB on EKG compared to previous. This 

was identified on her last office visit EKG. Nuclear stress test was offered, 

however she declined. Today, she denies recent episode of CP, SOB or decreased 

tolerance to completing ADLs due to fatigue or cardiac symptoms. UA suggests UTI


 (Teresita Goldman)





Review of Systems


Consitutional:  DENIES: Fatigue, Fever, Chills, Weight gain, Weight loss


Eyes:  DENIES: Amaurosis Fugax, Change in vision


HEENT:  DENIES: Lightheadedness, Change in hearing


Respiratory:  DENIES: See HPI, Cough, Snoring, Shortness of breath, Wheezing, 

Sputum production


Cardiovascular:  DENIES: See HPI, Chest pain, Palpitations, Syncope, Tachycardia


Gastrointestinal:  DENIES: Nausea, Vomiting, Change in bowel habits, Reflux, 

Bloody stools, Melena


Genitourinary:  DENIES: Urinary incontinence, Difficulty voiding


Integumentary:  DENIES: Rash


Neurologic:  COMPLAINS OF: Stroke symptoms, DENIES: Tingling or numbness, Poor 

Balance


Musculoskeletal:  DENIES: Joint pain, Muscle pain, Limited range of motion, 

Back pain


Psychiatric:  DENIES: Anxiety, Depression, Sleep disturbances


Hematologic:  DENIES: Bruising tendencies, Bleeding tendencies


Endocrine:  DENIES: Weight gain, Weight loss, Thyroid disease (Teresita Goldman

)





Past Family Social History


Allergies:  


Coded Allergies:  


     quinine (Unverified  Allergy, Severe, 8/15/17)


 FACIAL BUTTERFLY RASH


Past Medical History


TIA/CVA


HLD


Epilepsy


Depression


GERD


Restless leg syndrome


Vertebrobasilar artery syndrome


Past Surgical History


Csection 1972


Hysterectomy for endometrial cancer 2004


Reported Medications





Reported Meds & Active Scripts


Active


Reported


Warfarin 2 Mg Tab 2 Mg PO DAILY


Multiple Vitamin/Minerals (Multiple Vitamins W/ Minerals) 1 Tab Tab   


Clonazepam 0.5 Mg Tab 0.5 Mg PO HS


Pravastatin 20 Mg Tab 20 Mg PO DAILY


Duloxetine DR (Duloxetine HCl) 30 Mg Capdr 30 Mg PO DAILY


Acetaminophen Extra Strength Liq (Acetaminophen) 500 Mg/15 Ml Soln 500 Mg PO Q4-

6H PRN


Active Ordered Medications





Current Medications








 Medications


  (Trade)  Dose


 Ordered  Sig/Pardeep


 Route  Start Time


 Stop Time Status Last Admin


 


  (NS Flush)  2 ml  BID


 IV FLUSH  1/11/18 09:00


    1/11/18 10:04


 


 


  (NS Flush)  2 ml  UNSCH  PRN


 IV FLUSH  1/11/18 02:00


     


 


 


  (Aspirin)  325 mg  DAILY


 PO  1/11/18 09:00


    1/11/18 10:03


 


 


  (NovoLOG


 SUPPLEMENTAL


 SCALE)  1  ACHS


 SQ  1/11/18 08:00


     


 


 


  (D50w (Vial) Inj)  50 ml  UNSCH  PRN


 IV PUSH  1/11/18 02:00


     


 


 


  (Glucagon Inj)  1 mg  UNSCH  PRN


 OTHER  1/11/18 02:00


     


 


 


  (Cymbalta Dr)  30 mg  DAILY


 PO  1/11/18 09:00


    1/11/18 10:04


 


 


  (Pravachol)  20 mg  DAILY


 PO  1/11/18 09:00


    1/11/18 10:04


 


 


  (Coumadin


 Booklet)  1  ONCE  ONCE


 .XX  1/11/18 16:00


 1/11/18 16:01   


 


 


  (Coumadin)  6 mg  DAILY@1600


 PO  1/11/18 16:00


     


 


 


  (Heparin Inj)  5,000 units  Q12H


 SQ  1/11/18 10:00


    1/11/18 10:03


 


 


  (KlonoPIN)  0.25 mg  HS


 PO  1/11/18 21:00


     


 


 


  (Pill Splitter)  1 ea  UNSCH  PRN


 OTHER  1/11/18 11:00


     


 








Family History


Non contributory


Social History


no ETOH or smoking


 (Teresita Goldman)





Physical Exam


Vital Signs





Vital Signs








  Date Time  Temp Pulse Resp B/P (MAP) Pulse Ox O2 Delivery O2 Flow Rate FiO2


 


1/11/18 07:50  70      


 


1/11/18 07:39 96.4 68 16 144/68 (93) 95   


 


1/11/18 05:10  76      


 


1/11/18 04:30 98.0 72 18 128/65 (86) 96   


 


1/11/18 00:13 97.8 70 19 152/69 (96) 96   


 


1/10/18 22:40 98.0 72 18 152/69 (96) 96   


 


1/10/18 20:32  78 18 147/78 (101) 99 Room Air  


 


1/10/18 19:21     96 Room Air  


 


1/10/18 14:36 99.3 101 18 140/82 (101) 94 Room Air  








Laboratory





Laboratory Tests








Test


  1/10/18


15:35 1/11/18


06:30


 


White Blood Count 7.8  


 


Red Blood Count 4.67  


 


Hemoglobin 14.0  


 


Hematocrit 41.3  


 


Mean Corpuscular Volume 88.4  


 


Mean Corpuscular Hemoglobin 30.0  


 


Mean Corpuscular Hemoglobin


Concent 34.0 


  


 


 


Red Cell Distribution Width 13.7  


 


Platelet Count 229  


 


Mean Platelet Volume 7.7  


 


Neutrophils (%) (Auto) 71.6  


 


Lymphocytes (%) (Auto) 16.2  


 


Monocytes (%) (Auto) 10.3  


 


Eosinophils (%) (Auto) 1.2  


 


Basophils (%) (Auto) 0.7  


 


Neutrophils # (Auto) 5.6  


 


Lymphocytes # (Auto) 1.3  


 


Monocytes # (Auto) 0.8  


 


Eosinophils # (Auto) 0.1  


 


Basophils # (Auto) 0.1  


 


CBC Comment DIFF FINAL  


 


Differential Comment   


 


Prothrombin Time 16.5  16.3 


 


Prothromb Time International


Ratio 1.6 


  1.6 


 


 


Activated Partial


Thromboplast Time 30.6 


  


 


 


Urine Color YELLOW  


 


Urine Turbidity HAZY  


 


Urine pH 7.0  


 


Urine Specific Gravity 1.012  


 


Urine Protein NEG  


 


Urine Glucose (UA) NEG  


 


Urine Ketones NEG  


 


Urine Occult Blood TRACE  


 


Urine Nitrite NEG  


 


Urine Bilirubin NEG  


 


Urine Urobilinogen LESS THAN 2.0  


 


Urine Leukocyte Esterase LARGE  


 


Urine RBC 2  


 


Urine WBC 27  


 


Urine Squamous Epithelial


Cells 4 


  


 


 


Urine Renal Epithelial Cells 1  


 


Urine Bacteria OCC  


 


Microscopic Urinalysis Comment


  CATH-CULTURE


IND 


 


 


Blood Urea Nitrogen 17  


 


Creatinine 0.75  


 


Random Glucose 99  


 


Total Protein 7.0  


 


Albumin 3.5  


 


Calcium Level 8.6  


 


Alkaline Phosphatase 74  


 


Aspartate Amino Transf


(AST/SGOT) 20 


  


 


 


Alanine Aminotransferase


(ALT/SGPT) 25 


  


 


 


Total Bilirubin 0.3  


 


Sodium Level 140  


 


Potassium Level 4.1  


 


Chloride Level 104  


 


Carbon Dioxide Level 29.2  


 


Anion Gap 7  


 


Estimat Glomerular Filtration


Rate 74 


  


 


 


Troponin I LESS THAN 0.02  














 Date/Time


Source Procedure


Growth Status


 


 


 1/10/18 15:35


Urine Catheterized Urine Urine Culture


Pending Received








 (Teresita Goldman)


Result Diagram:  


1/10/18 1535                                                                   

             1/10/18 1535





Imaging


GENERAL: Elderly female finishing EEG


SKIN: Warm and dry.


HEAD: Atraumatic. Normocephalic. 


EYES: Pupils equal and round. No scleral icterus. 


ENT: No nasal bleeding or discharge.  


NECK: Trachea midline. No JVD. 


CARDIOVASCULAR: Regular rate and rhythm.  


RESPIRATORY: No accessory muscle use.Breath sounds equal bilaterally. 


GASTROINTESTINAL: Abdomen soft, non-tender, nondistended. 


MUSCULOSKELETAL: Extremities without clubbing, cyanosis, or edema. . 


NEUROLOGICAL: Awake and alert. No obvious cranial nerve deficits.    Normal 

speech.


PSYCHIATRIC: Appropriate mood and affect; insight and judgment normal.


 (Teresita Goldman)





Assessment and Plan


Assessment and Plan


RBBB


Acute episode of confusion- TIA vs seizure vs UTI. No atrial fibrillation since 

admission on telemetry. Subtherapeutic anticoagulation 


HLD








PLAN:


The patient denies cardiac symptoms. We will follow up the patient in the 

office for further evaluation


Dr Gudino following from Neurology standpoint


The patient was seen and evaluated by Dr Virk who completed face to face 

encounter and physical exam and participated in evaluation and management. 


 (Teresita Goldman)


Assessment and Plan


The exam, history, and the medical decision-making described in the above note 

were completed with the assistance of the mid-level provider. I reviewed and 

agree with the findings presented.  I attest that I had a face-to-face 

encounter with the patient on the same day, and personally performed and 

documented my assessment and findings in the medical record. Confusion with 

known RBBB, will follow up cardiac status as o/p


 (Kinjal Virk MD)











Teresita Goldman Jan 11, 2018 11:33


Kinjal Virk MD Jan 11, 2018 14:12

## 2018-01-11 NOTE — HHI.PR
Subjective


Remarks


Follow up for confusion, difficulty with word finding, possible TIA vs CVA. The 

patient reports feeling completely back to baseline today. She has been playing 

cards on electronic tablet without difficulty. She denies any further aphasia 

or speech difficulties. Denies any headache, lightheadedness, dizziness, blurred

/double vision, or unilateral numbness/weakness. She ambulated this morning 

without difficulty. She wants to go home today. Of note, discussed patient's 

EKG finding of RBBB, patient believes she's has this for awhile.





Objective


Vitals





Vital Signs








  Date Time  Temp Pulse Resp B/P (MAP) Pulse Ox O2 Delivery O2 Flow Rate FiO2


 


1/11/18 07:39 96.4 68 16 144/68 (93) 95   


 


1/11/18 05:10  76      


 


1/11/18 04:30 98.0 72 18 128/65 (86) 96   


 


1/11/18 00:13 97.8 70 19 152/69 (96) 96   


 


1/10/18 22:40 98.0 72 18 152/69 (96) 96   


 


1/10/18 20:32  78 18 147/78 (101) 99 Room Air  


 


1/10/18 19:21     96 Room Air  


 


1/10/18 14:36 99.3 101 18 140/82 (101) 94 Room Air  








Result Diagram:  


1/10/18 1535                                                                   

             1/10/18 1535





Imaging





Last Impressions








Carotid Artery Ultrasound 1/11/18 0000 Signed





Impressions: 





 Service Date/Time:  Thursday, January 11, 2018 08:00 - CONCLUSION: Mild 





 calcification with no evidence of stenosis.     Manolo Atkinson MD 


 


Head CT 1/10/18 1500 Signed





Impressions: 





 Service Date/Time:  Wednesday, January 10, 2018 15:22 - CONCLUSION:  No acute 





 intracranial abnormality.     Kieran Graves MD 








Objective Remarks


GENERAL: Well-nourished, well-developed pleasant elderly female patient in NAD.


SKIN: Warm and dry. No rash.


HEENT:  Normocephalic. Atraumatic.Pupils equal and round.   Mucous membranes 

pink and moist.


NECK: Supple. Trachea midline.  


CARDIOVASCULAR: Regular rate and rhythm.  S1, S2 noted. No murmur appreciated. 


RESPIRATORY: No accessory muscle use. Clear to auscultation. Breath sounds 

equal bilaterally.  


GASTROINTESTINAL: Abdomen soft, non-tender, nondistended. Normoactive bowel 

sounds x4.


MUSCULOSKELETAL: No obvious deformities. Extremities without clubbing, cyanosis

, or edema. 


NEUROLOGICAL: Awake and alert. No obvious cranial nerve deficits.  Motor 

grossly within normal limits. 5/5 muscle strength in bilateral upper and lower 

extremities.  Normal speech. No facial droop/lid lag/tongue deviation. 


PSYCHIATRIC: Appropriate mood and affect; insight and judgment normal.


Medications and IVs





Current Medications








 Medications


  (Trade)  Dose


 Ordered  Sig/Pardeep


 Route  Start Time


 Stop Time Status Last Admin


 


  (NS Flush)  2 ml  BID


 IV FLUSH  1/11/18 09:00


    1/11/18 10:04


 


 


  (NS Flush)  2 ml  UNSCH  PRN


 IV FLUSH  1/11/18 02:00


     


 


 


  (Aspirin)  325 mg  DAILY


 PO  1/11/18 09:00


    1/11/18 10:03


 


 


  (NovoLOG


 SUPPLEMENTAL


 SCALE)  1  ACHS


 SQ  1/11/18 08:00


     


 


 


  (D50w (Vial) Inj)  50 ml  UNSCH  PRN


 IV PUSH  1/11/18 02:00


     


 


 


  (Glucagon Inj)  1 mg  UNSCH  PRN


 OTHER  1/11/18 02:00


     


 


 


  (Cymbalta Dr)  30 mg  DAILY


 PO  1/11/18 09:00


    1/11/18 10:04


 


 


  (Pravachol)  20 mg  DAILY


 PO  1/11/18 09:00


    1/11/18 10:04


 


 


  (Coumadin


 Booklet)  1  ONCE  ONCE


 .XX  1/11/18 16:00


 1/11/18 16:01   


 


 


  (Coumadin)  6 mg  DAILY@1600


 PO  1/11/18 16:00


     


 


 


  (Heparin Inj)  5,000 units  Q12H


 SQ  1/11/18 10:00


    1/11/18 10:03


 











A/P


Problem List:  


(1) TIA (transient ischemic attack)


ICD Code:  G45.9 - Transient cerebral ischemic attack, unspecified


Status:  Acute


(2) Right bundle branch block (RBBB)


ICD Code:  I45.10 - Unspecified right bundle-branch block


(3) History of seizure


ICD Code:  Z87.898 - Personal history of other specified conditions


Assessment and Plan


82 year-old female with a history of seizure, TIA, endometrial cancer s/p DILAN 

and radiation, osteoporosis with spontaneous pelvic fractures, and cataracts 

who presented to the ED on 1/10/2018 for evaluation of TIA symptoms.  Head CT 

in the ER with no acute intracranial abnormality.  





Suspected TIA: rule out CVA. Head CT images reviewed with no acute findings. 

Patient with history of TIA on anticoagulation with Coumadin with 

subtherapeutic INR 1.6.


   - Consulted patient's neurologist Dr. Holt, seen by Dr. Gudino, 

appreciate recommendations


   - Carotid U/S with mild calcifications, no stenosis


   - Check brain MRI and MRA


   - EEG ordered by neurology


   - Check Echocardiogram


   - Check hemoglobin A1c and lipid profile


   - Monitor neuro checks q4h, NIHSS daily


   - Continue patient's coumadin, monitor daily INR


   - Consult PT/OT/ST, patient passed bedside swallow by RN, diet advanced





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


   - Continue patient's Klonopin 0.25 mg qhs 


   - seizure precautions





New Right Bundle Branch Block


   - Patient without any chest pain or shortness of breath


   - Patient follows with Dr. Virk as an outpatient - will consult him





UTI: UA with large leuks, occ bacteria. May be contributing to above symptoms.


   - start on IV Rocephin


   - monitor urine culture





DVT prophylaxis- continue Coumadin with sq Heparin


Discharge Planning


Discharge pending MRI/MRA, echo, EEG, and neurology consult.





Attending Statement


patient was seen and examined today.


presented with confusional episode.


work-up including MRI brain/ echo pending.


neurology and cardiology following.


rest of assessment and plan as noted above.





Problem Qualifiers





(1) TIA (transient ischemic attack):  


Qualified Codes:  G45.9 - Transient cerebral ischemic attack, unspecified








Bethany Lopez PA-C Jan 11, 2018 09:34


Devon Vance MD Jan 11, 2018 12:52

## 2018-01-11 NOTE — MB
cc:

AYSHA YANEZ M.D.

****

 

 

DATE OF CONSULTATION:  01/11/2018

 

HISTORY OF PRESENT ILLNESS

This is a 82-year-old right-handed woman with a history of

hypercholesterolemia.  She tells me she had a grand mal seizure about 4 years

ago where the next thing she knew she was in the ambulance. She had been seen

by Dr. Holt.

 

She has currently been on Coumadin, followed by Dr. Bernabe, her primary

care and with some history in 2004 of uterine cancer.

 

She has a history of several episodes in the last 5 or 6 years where one time

she had trouble writing a check and expressing herself and she was put on

Plavix and then she was on Plavix and then in May of 2016 saw Dr. Pierce because

she had trouble getting her speech out for maybe less than a minute, it is hard

to say how long.

 

Nevertheless, yesterday she was feeling fine and evidently had gone to work and

was home and was going to play [employer] and then could not figure out how to

play it on the computer.  She does have a history of some anxiety and panic

attacks but did not feel nervous during this.  No headache associated with that

at that time.

 

Although the chart says that she had several episodes around [place] of this

year with trouble texting, we asked the patient and she denied anything

recently around [place] of this past year.  She has had no episode since May

of 2016.

 

REVIEW OF SYSTEMS

She denies any headache, chest pain or palpitations, although she did have a

mild headache the day before that.  She denies any history of a hypertension,

diabetes, no definite atrial fibrillation, CABG, stent, angioplasty, renal,

hepatic or pulmonary disease, thyroid disease, lupus, ulcer.

 

SOCIAL HISTORY

Nonsmoker, nondrinker. Lives by herself.

 

 

 

FAMILY HISTORY

Negative for cancer or seizure.  Positive for stroke in her brother.

 

MEDICATION

1.  Coumadin, she takes 3 mg a day, Monday, Wednesday and Friday, 4 mg all

other days.

2.  Klonopin 0.5 at bedtime.

3.  Pravastatin.

4.  Cymbalta 30 a day.

5.  Tylenol.

 

ALLERGIES

QUININE.

 

PHYSICAL EXAMINATION

VITAL SIGNS: She is in sinus rhythm here, 140/78.

NECK: There were no carotid bruits.

HEART: Regular rhythm. I do not detect a murmur.

NEURO: Pupils are equal.  Visual fields are full.  Extraocular movements intact

without nystagmus.  Face is symmetric with normal sensation. Tongue was

midline. There is no drift.  She had normal strength in upper and lower

extremities bilaterally.  DTRs are 2+ symmetric throughout except the left knee

jerk is 3+ compared to the right. Toes are downgoing bilaterally.  There is no

ankle clonus.  Tone is normal throughout.  Pinprick is intact throughout. She

is not ataxic on finger-to-nose.  Speech is fluent. She is not aphasic.  She

had normal repetition. Short-term memory is 3/3 at 2 minutes.

 

LABORATORY DATA

CBC is normal.  RPR has been negative.

 

Urine drug screen was negative in 2014.  UA on this admission showed large

amount of leukocyte esterase, 27 white cells.

 

Basic metabolic profile is normal.  LFTs are normal.  Troponin negative.  LDL

cholesterol was normal in May of 2016.  B12 was normal at that time also.

Thyroid was 3.8 minimally elevated at that time the TSH.

 

IMAGING STUDIES

Carotid ultrasound was just done and is pending.

 

CAT scan of the brain yesterday was negative.

 

MRI of the brain done in May 2016 was read as normal.

 

CTA of the [id number] was normal at that time.

 

CTA of the neck showed tortuous left common carotid artery otherwise normal.

 

She had an echocardiogram done in May of 2016 which was normal.  Left atrial

size was normal.

 

She must have gone to a different hospital with her seizure as there is no EEGs

ever done here.

 

Review of the MRI films from May of 2016 shows minimal white matter changes

bilaterally, otherwise intact.

 

IMPRESSION

Possibly a TIA.  Will see what the MRI of the brain shows here.  Will just

check a Iqfpgl-ds-Dpvjjt and since her INR was low here at 1.6, she did not get

any Coumadin last night.  I would give her 6 mg of Coumadin today total dose.

 

Get her INR back up to over 2.0.

 

We will check an EEG and the MRI of the brain. Overall, I thought she looked

normal neurologically here. Another possibility is these episodes could be

small complex partial seizures. It does appear she may have a UTI that should

be treated.

 

 

 

                              _________________________________

                              MD LÁZARO Jarrett/RADHA DURAN:  1/11/2018/8:42 AM

T:  1/11/2018/9:16 AM

Visit #:  [id number]

Job #:  [id number]

## 2018-01-11 NOTE — RADRPT
EXAM DATE/TIME:  01/11/2018 08:00 

 

[facility] COMPARISON:     

US CAROTID ARTERIES, May 02, 2016, 9:45.

 

EXTERNAL COMPARISON :    

[facility], MRA Carotids, December 1, [id number]

 

 

INDICATIONS :     

Cerebrovascular accident.

                                     

 

MEDICAL HISTORY :           

Endometrial cancer.  

 

SURGICAL HISTORY :          

Hysterectomy.

 

ENCOUNTER:     

Subsequent

 

ACUITY:     

1 day

 

PAIN SCORE:     

0/10

 

LOCATION:     

Bilateral neck 

                                     

PEAK SYSTOLIC VELOCITIES (cm/sec):

 

ICA/CCA RATIO:                    

Right: 1.2     Left: 1.1

 

ICA:                          

Right: 82     Left: 112

 

CCA:                          

Right: 67     Left: 106

 

ECA:                           

Right: 68     Left: 122

 

VERTEBRAL:           

Right: 51 antegrade     Left: 55 antegrade

             

Elevated flow velocities and ICA/CCA ratios have been found to correlate with increased degrees of

vessel stenosis, calculated as percentage of diameter relative to a normal segment of distal ICA/CCA

 

FINDINGS:     

 

RIGHT CAROTID:     

No significant stenosis is visualized. Mild calcification is noted in the bulb. The waveforms are wit
hin normal limits.

 

LEFT CAROTID:     

No significant stenosis is visualized.  The waveforms are within normal limits. Left common carotid a
rtery is tortuous.

 

VERTEBRAL ARTERIES:  

Antegrade flow is seen in both vertebral arteries.

MISCELLANEOUS:     None.

 

CONCLUSION:     Mild calcification with no evidence of stenosis. 

 

 

 Manolo Atkinson MD on January 11, 2018 at 9:08           

Board Certified Radiologist.

 This report was verified electronically.

## 2018-01-12 VITALS
DIASTOLIC BLOOD PRESSURE: 55 MMHG | OXYGEN SATURATION: 95 % | TEMPERATURE: 97.7 F | RESPIRATION RATE: 17 BRPM | HEART RATE: 80 BPM | SYSTOLIC BLOOD PRESSURE: 125 MMHG

## 2018-01-12 VITALS
RESPIRATION RATE: 18 BRPM | HEART RATE: 96 BPM | TEMPERATURE: 97.9 F | SYSTOLIC BLOOD PRESSURE: 109 MMHG | DIASTOLIC BLOOD PRESSURE: 71 MMHG | OXYGEN SATURATION: 92 %

## 2018-01-12 VITALS
RESPIRATION RATE: 17 BRPM | TEMPERATURE: 97.5 F | OXYGEN SATURATION: 94 % | HEART RATE: 73 BPM | DIASTOLIC BLOOD PRESSURE: 62 MMHG | SYSTOLIC BLOOD PRESSURE: 120 MMHG

## 2018-01-12 VITALS
OXYGEN SATURATION: 95 % | TEMPERATURE: 97.2 F | RESPIRATION RATE: 18 BRPM | DIASTOLIC BLOOD PRESSURE: 71 MMHG | HEART RATE: 77 BPM | SYSTOLIC BLOOD PRESSURE: 156 MMHG

## 2018-01-12 VITALS — HEART RATE: 65 BPM

## 2018-01-12 VITALS
HEART RATE: 78 BPM | TEMPERATURE: 97.9 F | OXYGEN SATURATION: 93 % | RESPIRATION RATE: 18 BRPM | DIASTOLIC BLOOD PRESSURE: 52 MMHG | SYSTOLIC BLOOD PRESSURE: 99 MMHG

## 2018-01-12 VITALS
HEART RATE: 70 BPM | SYSTOLIC BLOOD PRESSURE: 150 MMHG | OXYGEN SATURATION: 96 % | DIASTOLIC BLOOD PRESSURE: 72 MMHG | RESPIRATION RATE: 18 BRPM | TEMPERATURE: 97.6 F

## 2018-01-12 VITALS
SYSTOLIC BLOOD PRESSURE: 133 MMHG | RESPIRATION RATE: 18 BRPM | OXYGEN SATURATION: 95 % | DIASTOLIC BLOOD PRESSURE: 65 MMHG | HEART RATE: 75 BPM | TEMPERATURE: 97.8 F

## 2018-01-12 VITALS — HEART RATE: 103 BPM

## 2018-01-12 VITALS — HEART RATE: 68 BPM

## 2018-01-12 VITALS — HEART RATE: 72 BPM

## 2018-01-12 LAB
ANA SER QL: (no result)
CHOLEST SERPL-MCNC: 143 MG/DL (ref 120–200)
CHOLESTEROL/ HDL RATIO: 2.23 RATIO
HDLC SERPL-MCNC: 63.9 MG/DL (ref 40–60)
INR PPP: 1.6 RATIO
LDLC SERPL-MCNC: 64 MG/DL (ref 0–99)
PROTHROMBIN TIME: 15.7 SEC (ref 9.8–11.6)
TRIGL SERPL-MCNC: 76 MG/DL (ref 42–150)

## 2018-01-12 RX ADMIN — Medication SCH ML: at 10:41

## 2018-01-12 RX ADMIN — PRAVASTATIN SODIUM SCH MG: 20 TABLET ORAL at 10:41

## 2018-01-12 RX ADMIN — Medication SCH ML: at 21:00

## 2018-01-12 RX ADMIN — WARFARIN SODIUM SCH MG: 6 TABLET ORAL at 16:34

## 2018-01-12 RX ADMIN — HEPARIN SODIUM SCH UNITS: 10000 INJECTION, SOLUTION INTRAVENOUS; SUBCUTANEOUS at 10:42

## 2018-01-12 RX ADMIN — INSULIN ASPART SCH: 100 INJECTION, SOLUTION INTRAVENOUS; SUBCUTANEOUS at 17:53

## 2018-01-12 RX ADMIN — INSULIN ASPART SCH: 100 INJECTION, SOLUTION INTRAVENOUS; SUBCUTANEOUS at 12:42

## 2018-01-12 RX ADMIN — DULOXETINE HYDROCHLORIDE SCH MG: 30 CAPSULE, DELAYED RELEASE ORAL at 10:41

## 2018-01-12 RX ADMIN — INSULIN ASPART SCH: 100 INJECTION, SOLUTION INTRAVENOUS; SUBCUTANEOUS at 21:00

## 2018-01-12 RX ADMIN — INSULIN ASPART SCH: 100 INJECTION, SOLUTION INTRAVENOUS; SUBCUTANEOUS at 09:05

## 2018-01-12 RX ADMIN — HEPARIN SODIUM SCH UNITS: 10000 INJECTION, SOLUTION INTRAVENOUS; SUBCUTANEOUS at 21:27

## 2018-01-12 RX ADMIN — ASPIRIN SCH MG: 325 TABLET ORAL at 10:41

## 2018-01-12 NOTE — EKG
Date Performed: 01/10/2018       Time Performed: 17:53:52

 

PTAGE:      82 years

 

EKG:      Sinus rhythm 

 

 RIGHT BUNDLE BRANCH BLOCK ABNORMAL ECG Compared to 

 

 PREVIOUS TRACING            , right bundle branch block is new. PREVIOUS TRACIN2016 07.35

 

DOCTOR:   Rosalva Lomas  Interpretating Date/Time  2018 11:35:15

## 2018-01-12 NOTE — HHI.PR
Subjective


Remarks


in no acute distress.


no new complaints.


INR trend noted.





Objective


Vitals





Vital Signs








  Date Time  Temp Pulse Resp B/P (MAP) Pulse Ox O2 Delivery O2 Flow Rate FiO2


 


1/12/18 08:38 97.6 70 18 150/72 (98) 96   


 


1/12/18 04:35 97.8 75 18 133/65 (87) 95   


 


1/12/18 00:10 97.9 78 18 99/52 (68) 93   


 


1/11/18 20:19 98.0 78 18 122/62 (82) 94   


 


1/11/18 16:51    126/60 (82)    





    122/60 (80)    





    127/62 (83)    


 


1/11/18 16:00 97.9 77 16 144/69 (94) 95   


 


1/11/18 12:12 98.1 81 16 124/72 (89) 95   





    120/69 (86)    





    144/65 (91)    


 


1/11/18 12:09  82      














I/O      


 


 1/11/18 1/11/18 1/11/18 1/12/18 1/12/18 1/12/18





 07:00 15:00 23:00 07:00 15:00 23:00


 


Intake Total   300 ml   


 


Balance   300 ml   


 


      


 


Intake Oral   200 ml   


 


IV Total   100 ml   


 


# Voids 1   2  








Result Diagram:  


1/10/18 1535                                                                   

             1/10/18 1535





Imaging





Last Impressions








Brain MRI 1/11/18 0852 Signed





Impressions: 





 Service Date/Time:  Thursday, January 11, 2018 11:10 - CONCLUSION:  1. No 

acute 





 hemorrhage, mass or infarction. 2. Mild atrophy and chronic small vessel 





 ischemic change.     Manolo Atkinson MD 


 


Head Magnetic Resonance Angiography 1/11/18 0000 Signed





Impressions: 





 Service Date/Time:  Thursday, January 11, 2018 11:10 - CONCLUSION:  1. 





 Unremarkable MRA examination of the brain. Specifically, no evidence for large 





 vessel occlusion or significant cerebral artery stenosis.     Hema Garcia MD 


 


Carotid Artery Ultrasound 1/11/18 0000 Signed





Impressions: 





 Service Date/Time:  Thursday, January 11, 2018 08:00 - CONCLUSION: Mild 





 calcification with no evidence of stenosis.     Manolo Atkinson MD 


 


Head CT 1/10/18 1500 Signed





Impressions: 





 Service Date/Time:  Wednesday, January 10, 2018 15:22 - CONCLUSION:  No acute 





 intracranial abnormality.     Kieran Graves MD 








Objective Remarks


GENERAL: This is a well-nourished, well-developed patient, in no apparent 

distress.


CARDIOVASCULAR: Regular rate and regular rhythm without murmurs, gallops, or 

rubs. 


RESPIRATORY: Clear to auscultation. Breath sounds equal bilaterally. No wheezes

, rales, or rhonchi.  


GASTROINTESTINAL: Abdomen soft, non-tender, nondistended. 


Normal, active bowel sounds


MUSCULOSKELETAL: Extremities without clubbing, cyanosis, or edema.


NEURO:  Alert & Oriented x4 to person, place, time, situation.  Moves all ext x4


Medications and IVs





Inpatient Medications


Aspirin (Aspirin) 325 mg DAILY PO  Last administered on 1/12/18at 10:41;  Start 

1/11/18 at 09:00


Ceftriaxone Sodium 1000 mg/ Sodium Chloride 100 ml @  200 mls/hr Q24H IV  Last 

administered on 1/11/18at 14:47;  Start 1/11/18 at 13:00


Clonazepam (KlonoPIN) 0.25 mg HS PO  Last administered on 1/11/18at 22:00;  

Start 1/11/18 at 21:00


Dextrose (D50w (Vial) Inj) 50 ml UNSCH  PRN IV PUSH HYPOGLYCEMIA-SEE COMMENTS;  

Start 1/11/18 at 02:00


Duloxetine HCl (Cymbalta Dr) 30 mg DAILY PO  Last administered on 1/12/18at 10:

41;  Start 1/11/18 at 09:00


Glucagon (Glucagon Inj) 1 mg UNSCH  PRN OTHER HYPOGLYCEMIA-SEE COMMENTS;  Start 

1/11/18 at 02:00


Heparin Sodium (Porcine) (Heparin Inj) 5,000 units Q12H SQ  Last administered 

on 1/12/18at 10:42;  Start 1/11/18 at 10:00


Insulin Aspart (NovoLOG SUPPLEMENTAL SCALE) 1 ACHS SQ ;  Start 1/11/18 at 08:00


Miscellaneous (Pill Splitter) 1 ea UNSCH  PRN OTHER SEE LABEL COMMENTS;  Start 1 /11/18 at 11:00


Patient Medication Teaching (Coumadin Booklet) 1 ONCE  ONCE .XX  Last 

administered on 1/11/18at 17:18;  Start 1/11/18 at 16:00;  Stop 1/11/18 at 16:01

;  Status DC


Pravastatin Sodium (Pravachol) 20 mg DAILY PO  Last administered on 1/12/18at 10

:41;  Start 1/11/18 at 09:00


Sodium Chloride (NS Flush) 2 ml UNSCH  PRN IV FLUSH FLUSH AFTER USING IV ACCESS

;  Start 1/11/18 at 02:00


Warfarin Sodium (Coumadin) 6 mg DAILY@1600 PO  Last administered on 1/11/18at 17

:15;  Start 1/11/18 at 16:00





A/P


Problem List:  


(1) TIA (transient ischemic attack)


ICD Code:  G45.9 - Transient cerebral ischemic attack, unspecified


Status:  Acute


(2) Right bundle branch block (RBBB)


ICD Code:  I45.10 - Unspecified right bundle-branch block


(3) History of seizure


ICD Code:  Z87.898 - Personal history of other specified conditions


Assessment and Plan


A/P  





Suspected TIA


   - Consulted patient's neurologist Dr. Holt, seen by Dr. Gudino, 

appreciate recommendations


   - Carotid U/S with mild calcifications, no stenosis


   - MRI/MRA brain with no acute abnormality.


   - EEG with no epileptiform activity.


   - Monitor neuro checks q4h, NIHSS daily


   - Continue patient's coumadin, monitor daily INR


   - Consult PT/OT/ST, patient passed bedside swallow by RN, diet advanced





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


   - Continue patient's Klonopin 0.25 mg qhs 


   - seizure precautions





New Right Bundle Branch Block


   - Patient without any chest pain or shortness of breath


   - cardiology consult appreciated and plan for outpatient follow-up.





abnormal UA- UC with gram-positive mixed anaya- will dc IV antibiotic.





DVT prophylaxis- continue Coumadin with sq Heparin


Discharge Planning


hopefully tomorrow if INR > 1.9.





Problem Qualifiers





(1) TIA (transient ischemic attack):  


Qualified Codes:  G45.9 - Transient cerebral ischemic attack, unspecified








Devon Vance MD Jan 12, 2018 11:52

## 2018-01-12 NOTE — HHI.PR
Objective





Vital Signs








  Date Time  Temp Pulse Resp B/P (MAP) Pulse Ox O2 Delivery O2 Flow Rate FiO2


 


1/12/18 04:35 97.8 75 18 133/65 (87) 95   


 


1/12/18 00:10 97.9 78 18 99/52 (68) 93   


 


1/11/18 20:19 98.0 78 18 122/62 (82) 94   


 


1/11/18 16:51    126/60 (82)    





    122/60 (80)    





    127/62 (83)    


 


1/11/18 16:00 97.9 77 16 144/69 (94) 95   


 


1/11/18 12:12 98.1 81 16 124/72 (89) 95   





    120/69 (86)    





    144/65 (91)    


 


1/11/18 12:09  82      


 


1/11/18 07:50  70      


 


1/11/18 07:39 96.4 68 16 144/68 (93) 95   














I/O      


 


 1/11/18 1/11/18 1/11/18 1/12/18 1/12/18 1/12/18





 06:59 14:59 22:59 06:59 14:59 22:59


 


Intake Total   300 ml   


 


Balance   300 ml   


 


      


 


Intake Oral   200 ml   


 


IV Total   100 ml   


 


# Voids 1   2  








Result Diagram:  


1/10/18 1535                                                                   

             1/10/18 1535





Objective Remarks


nl speech





Assessment and Plan


Assessment and Plan


imp


mri neg us neg


eeg neg esr nl


sr





inr pend





can dc when inr>1.9





med team please dose her coumadin carefully with regard to what she was on at 

home











Sriram Gudino MD Jan 12, 2018 07:19

## 2018-01-12 NOTE — ECHRPT
Indication:   CVA/TIA

 

 CONCLUSIONS

 Normal left ventricular size. 

 Wall thickness is normal. 

 The left ventricular systolic function is grossly normal on limited imaging. 

 The left atrial size is mild-to-moderately dilated. 

 Mild to moderate mitral valve regurgitation. 

 Moderate mitral annular calcification. 

 Aortic valve sclerosis is present. 

 Mild aortic valve regurgitation. 

 There is trace tricuspid valve regurgitation. 

 

 

 BP:  144   / 68      HR:                          Rhythm:           Sinus

 

 MEASUREMENTS  (Male / Female) Normal Values       Technical Quality:Fair

 2D ECHO

 LV Diastolic Diameter PLAX        4.2 cm                4.2 - 5.9 / 3.9 - 5.3 cm

 LV Systolic Diameter PLAX         2.8 cm                

 IVS Diastolic Thickness           0.9 cm                0.6 - 1.0 / 0.6 - 0.9 cm

 LVPW Diastolic Thickness          0.9 cm                0.6 - 1.0 / 0.6 - 0.9 cm

 LV Relative Wall Thickness        0.4                   

 RV Internal Dim ED PLAX           2.8 cm                

 LVOT Diameter                     1.5 cm                

 Aortic Root Diameter              3.3 cm                

 LA Systolic Diameter LX           3.4 cm                3.0 - 4.0 / 2.7 - 3.8 cm

 

 M-MODE

 AV Cusp Separation MM             1.6 cm                

 

 DOPPLER

 AV Peak Velocity                  175.5 cm/s            

 AV Peak Gradient                  12.3 mmHg             

 AV Mean Gradient                  7.0 mmHg              

 AV Velocity Time Integral         36.9 cm               

 LVOT Peak Velocity                142.0 cm/s            

 LVOT Peak Gradient                8.1 mmHg              

 LVOT Velocity Time Integral       29.9 cm               

 AV Area Cont Eq vti               1.4 cm               

 AV Area Cont Eq pk                1.4 cm               

 Mitral E Point Velocity           80.5 cm/s             

 Mitral A Point Velocity           126.0 cm/s            

 Mitral E to A Ratio               0.6                   

 LV E' Lateral Velocity            7.1 cm/s              

 Mitral E to LV E' Lateral Ratio   11.3                  

 LV E' Septal Velocity             7.0 cm/s              

 Mitral E to LV E' Septal Ratio    11.5                  

 PV Peak Velocity                  79.7 cm/s             

 PV Peak Gradient                  2.5 mmHg              

 

 

 FINDINGS

 

 LEFT VENTRICLE

 Normal left ventricular size. 

 Wall thickness is normal. 

 The left ventricular systolic function is grossly normal on limited imaging. 

 

 RIGHT VENTRICLE

 Normal right ventricular size and systolic function.  

 

 LEFT ATRIUM

 The left atrial size is mild-to-moderately dilated. 

 

 RIGHT ATRIUM

 The right atrial size is normal.  

 

 ATRIAL SEPTUM

 Normal atrial septal thickness without atrial level shunting by limited color doppler interrogation.
  

 

 AORTA

 The aortic root and proximal ascending aorta are normal in size on limited imaging.  

 

 MITRAL VALVE

 Mild to moderate mitral valve regurgitation. 

 Moderate mitral annular calcification. 

  

 

 AORTIC VALVE

 Aortic valve sclerosis is present. 

 Mild aortic valve regurgitation. 

 

 TRICUSPID VALVE

 There is trace tricuspid valve regurgitation. 

 

 PULMONARY VALVE

 No pulmonary valve regurgitation or stenosis. 

 

 VESSELS

 The inferior vena cava is normal in size.  

 

 PERICARDIUM

 No pericardial effusion.  

 

 

 

 

  Jackson Rollins MD, FACC

  (Electronically Signed)

  Final Date:12 January 2018 16:30

## 2018-01-13 VITALS
DIASTOLIC BLOOD PRESSURE: 67 MMHG | HEART RATE: 72 BPM | SYSTOLIC BLOOD PRESSURE: 123 MMHG | RESPIRATION RATE: 18 BRPM | OXYGEN SATURATION: 94 % | TEMPERATURE: 98 F

## 2018-01-13 VITALS
TEMPERATURE: 98.4 F | SYSTOLIC BLOOD PRESSURE: 127 MMHG | RESPIRATION RATE: 24 BRPM | HEART RATE: 77 BPM | OXYGEN SATURATION: 100 % | DIASTOLIC BLOOD PRESSURE: 62 MMHG

## 2018-01-13 VITALS — HEART RATE: 68 BPM

## 2018-01-13 LAB
INR PPP: 2 RATIO
PROTHROMBIN TIME: 20.7 SEC (ref 9.8–11.6)

## 2018-01-13 RX ADMIN — HEPARIN SODIUM SCH UNITS: 10000 INJECTION, SOLUTION INTRAVENOUS; SUBCUTANEOUS at 10:00

## 2018-01-13 RX ADMIN — INSULIN ASPART SCH: 100 INJECTION, SOLUTION INTRAVENOUS; SUBCUTANEOUS at 08:00

## 2018-01-13 RX ADMIN — ASPIRIN SCH MG: 325 TABLET ORAL at 08:39

## 2018-01-13 RX ADMIN — PRAVASTATIN SODIUM SCH MG: 20 TABLET ORAL at 08:39

## 2018-01-13 RX ADMIN — Medication SCH ML: at 08:39

## 2018-01-13 RX ADMIN — DULOXETINE HYDROCHLORIDE SCH MG: 30 CAPSULE, DELAYED RELEASE ORAL at 08:39

## 2018-01-13 NOTE — HHI.PR
Subjective


Remarks


in no acute distress.


denies pain, dizziness, weakness.


no new complaints.


wants to go home today.





Objective


Vitals





Vital Signs








  Date Time  Temp Pulse Resp B/P (MAP) Pulse Ox O2 Delivery O2 Flow Rate FiO2


 


1/13/18 07:33 98.4 77 24 127/62 (83) 100   


 


1/13/18 02:56 98.0 72 18 123/67 (85) 94   


 


1/12/18 23:43 97.5 73 17 120/62 (81) 94   


 


1/12/18 23:31  103      


 


1/12/18 20:41 97.7 80 17 125/55 (78) 95   


 


1/12/18 17:29  72      


 


1/12/18 16:39 97.2 77 18 156/71 (99) 95   


 


1/12/18 12:29  65      


 


1/12/18 12:03 97.9 96 18 109/71 (84) 92   


 


1/12/18 08:38 97.6 70 18 150/72 (98) 96   














I/O      


 


 1/12/18 1/12/18 1/12/18 1/13/18 1/13/18 1/13/18





 07:00 15:00 23:00 07:00 15:00 23:00


 


Intake Total  480 ml 200 ml  200 ml 


 


Balance  480 ml 200 ml  200 ml 


 


      


 


Intake Oral  480 ml 200 ml  200 ml 


 


# Voids 2 3    








Result Diagram:  


1/10/18 1535                                                                   

             1/10/18 1535





Imaging





Last Impressions








Brain MRI 1/11/18 0852 Signed





Impressions: 





 Service Date/Time:  Thursday, January 11, 2018 11:10 - CONCLUSION:  1. No 

acute 





 hemorrhage, mass or infarction. 2. Mild atrophy and chronic small vessel 





 ischemic change.     Manolo Atkinson MD 


 


Head Magnetic Resonance Angiography 1/11/18 0000 Signed





Impressions: 





 Service Date/Time:  Thursday, January 11, 2018 11:10 - CONCLUSION:  1. 





 Unremarkable MRA examination of the brain. Specifically, no evidence for large 





 vessel occlusion or significant cerebral artery stenosis.     Hema Garcia MD 


 


Carotid Artery Ultrasound 1/11/18 0000 Signed





Impressions: 





 Service Date/Time:  Thursday, January 11, 2018 08:00 - CONCLUSION: Mild 





 calcification with no evidence of stenosis.     Manolo Atkinson MD 


 


Head CT 1/10/18 1500 Signed





Impressions: 





 Service Date/Time:  Wednesday, January 10, 2018 15:22 - CONCLUSION:  No acute 





 intracranial abnormality.     Kieran Graves MD 








Objective Remarks


GENERAL: This is a well-nourished, well-developed patient, in no apparent 

distress.


CARDIOVASCULAR: Regular rate and regular rhythm without murmurs, gallops, or 

rubs. 


RESPIRATORY: Clear to auscultation. Breath sounds equal bilaterally. No wheezes

, rales, or rhonchi.  


GASTROINTESTINAL: Abdomen soft, non-tender, nondistended. 


Normal, active bowel sounds


MUSCULOSKELETAL: Extremities without clubbing, cyanosis, or edema.


NEURO:  Alert & Oriented x4 to person, place, time, situation.  Moves all ext x4


Procedures


none


Medications and IVs





Inpatient Medications


Aspirin (Aspirin) 325 mg DAILY PO  Last administered on 1/12/18at 10:41;  Start 

1/11/18 at 09:00


Ceftriaxone Sodium 1000 mg/ Sodium Chloride 100 ml @  200 mls/hr Q24H IV  Last 

administered on 1/11/18at 14:47;  Start 1/11/18 at 13:00;  Stop 1/12/18 at 11:59

;  Status DC


Clonazepam (KlonoPIN) 0.25 mg HS PO  Last administered on 1/12/18at 21:26;  

Start 1/11/18 at 21:00


Dextrose (D50w (Vial) Inj) 50 ml UNSCH  PRN IV PUSH HYPOGLYCEMIA-SEE COMMENTS;  

Start 1/11/18 at 02:00


Duloxetine HCl (Cymbalta Dr) 30 mg DAILY PO  Last administered on 1/12/18at 10:

41;  Start 1/11/18 at 09:00


Glucagon (Glucagon Inj) 1 mg UNSCH  PRN OTHER HYPOGLYCEMIA-SEE COMMENTS;  Start 

1/11/18 at 02:00


Heparin Sodium (Porcine) (Heparin Inj) 5,000 units Q12H SQ  Last administered 

on 1/12/18at 21:27;  Start 1/11/18 at 10:00


Insulin Aspart (NovoLOG SUPPLEMENTAL SCALE) 1 ACHS SQ ;  Start 1/11/18 at 08:00


Miscellaneous (Pill Splitter) 1 ea UNSCH  PRN OTHER SEE LABEL COMMENTS;  Start 1 /11/18 at 11:00


Patient Medication Teaching (Coumadin Booklet) 1 ONCE  ONCE .XX  Last 

administered on 1/11/18at 17:18;  Start 1/11/18 at 16:00;  Stop 1/11/18 at 16:01

;  Status DC


Pravastatin Sodium (Pravachol) 20 mg DAILY PO  Last administered on 1/12/18at 10

:41;  Start 1/11/18 at 09:00


Sodium Chloride (NS Flush) 2 ml UNSCH  PRN IV FLUSH FLUSH AFTER USING IV ACCESS

;  Start 1/11/18 at 02:00


Warfarin Sodium (Coumadin) 2 mg ONCE  ONCE PO  Last administered on 1/12/18at 16

:34;  Start 1/12/18 at 16:00;  Stop 1/12/18 at 16:01;  Status DC





A/P


Problem List:  


(1) TIA (transient ischemic attack)


ICD Code:  G45.9 - Transient cerebral ischemic attack, unspecified


Status:  Acute


(2) Right bundle branch block (RBBB)


ICD Code:  I45.10 - Unspecified right bundle-branch block


(3) History of seizure


ICD Code:  Z87.898 - Personal history of other specified conditions


Assessment and Plan


A/P  





Suspected TIA


   - Consulted patient's neurologist Dr. Holt, seen by Dr. Gudino, 

appreciate recommendations


   - Carotid U/S with mild calcifications, no stenosis


   - MRI/MRA brain with no acute abnormality.


   - EEG with no epileptiform activity.


   - Monitor neuro checks q4h, NIHSS daily


   - Continue patient's coumadin, monitor daily INR


   - Consult PT/OT/ST, patient passed bedside swallow by RN, diet advanced





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


   - Continue patient's Klonopin 0.25 mg qhs 


   - seizure precautions





New Right Bundle Branch Block


   - Patient without any chest pain or shortness of breath


   - cardiology consult appreciated and plan for outpatient follow-up.





abnormal UA- UC with gram-positive mixed anaya





DVT prophylaxis- continue Coumadin with sq Heparin


Discharge Planning


dc home today.


f/u; pcp,cardiology and neurology.





Problem Qualifiers





(1) TIA (transient ischemic attack):  


Qualified Codes:  G45.9 - Transient cerebral ischemic attack, unspecified








Devon Vance MD Jan 13, 2018 08:32

## 2018-01-13 NOTE — HHI.DCPOC
Discharge Care Plan


Diagnosis:  


(1) TIA (transient ischemic attack)


Goals to Promote Your Health


* To prevent worsening of your condition and complications


* To maintain your health at the optimal level


Directions to Meet Your Goals


*** Take your medications as prescribed


*** Follow your dietary instruction


*** Follow activity as directed








*** Keep your appointments as scheduled


*** Take your immunizations and boosters as scheduled


*** If your symptoms worsen call your PCP, if no PCP go to Urgent Care Center 

or Emergency Room***


*** Smoking is Dangerous to Your Health. Avoid second hand smoke***


***Call the 24-hour hour crisis hotline for domestic abuse at 1-344.907.6645***











Bethany Lopez PA-C Jan 13, 2018 8:03 am

## 2018-01-13 NOTE — HHI.DS
__________________________________________________





Discharge Summary


Admission Date


Luis 10, 2018 at 20:29


Discharge Date:  Jan 13, 2018


Admitting Diagnosis





acute TIA 


.





(1) TIA (transient ischemic attack)


ICD Code:  G45.9 - Transient cerebral ischemic attack, unspecified


Diagnosis:  Principal


Status:  Acute


(2) Right bundle branch block (RBBB)


ICD Code:  I45.10 - Unspecified right bundle-branch block


Diagnosis:  Secondary





(3) History of seizure


ICD Code:  Z87.898 - Personal history of other specified conditions


Diagnosis:  Secondary





Procedures


none


Brief History - From Admission


Ms. Corona is a very pleasant 82 year-old female with a history of seizure, 

TIA, endometrial cancer status post DILAN and radiation, osteoporosis with 

spontaneous pelvic fractures, and cataracts who presented to the emergency room 

on 1/10/2018 for evaluation of TIA symptoms.  Head CT in the ER with no acute 

intracranial abnormality.  





The patient is seen in the CDU.  The patient reports that she volunteered at 

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  She went home and 

sat down to play [employer] and noted that she was having difficulty matching 

cards.  She started to try to send text messages to her daughter and had 

difficulty spelling words and finishing her message.  She has similar episode 

to this about 3 years ago with a TIA followed by a grand mal seizure.  She sees 

Dr. Holt and takes clonazepam 0.5 mg daily at bedtime for seizure prevention 

along with restless legs syndrome.  She's had no additional seizures since the 

one 3 years ago.  She had a cardiology workup at that time that showed PAT.  

She states they were never able to find any atrial fibrillation but she was 

recommended to start Coumadin for anticoagulation.  Upon reviewing Dr. Pierce's 

note dated 5/3/2016, he recommended Coumadin long-term since the patient's 

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 

patient on Plavix.





The patient denies any unilateral weakness, facial drooping, or headache.  She 

denies any recent cough, cold, or flu symptoms.  She denies any recent fevers 

or chills though she was trying to send her daughter a text message saying "I 

am trying to get warm" earlier on 1/10/2018.  Her last visit with Dr. Holt 

was one year ago and her last visit with Dr. Virk was 1 year ago also.  She 

has new finding of right bundle branch block on 12-lead EKG done in the ED not 

noted on prior 12-lead EKGs.


CBC/BMP:  


1/10/18 1535                                                                   

             1/10/18 1535





Significant Findings





Laboratory Tests








Test


  1/10/18


15:35 1/11/18


06:30 1/11/18


13:37 1/12/18


06:00


 


Neutrophils (%) (Auto)


  71.6 %


(16.0-70.0) 


  


  


 


 


Monocytes (%) (Auto)


  10.3 %


(0.0-8.0) 


  


  


 


 


Prothrombin Time


  16.5 SEC


(9.8-11.6) 16.3 SEC


(9.8-11.6) 


  15.7 SEC


(9.8-11.6)


 


Activated Partial


Thromboplast Time 30.6 SEC


(24.3-30.1) 


  


  


 


 


Urine Turbidity HAZY (CLEAR)    


 


Urine Occult Blood TRACE (NEG)    


 


Urine Leukocyte Esterase LARGE (NEG)    


 


Urine WBC 27 /hpf (0-5)    


 


Urine Bacteria


  OCC /hpf


(NONE) 


  


  


 


 


Estimat Glomerular Filtration


Rate 74 ML/MIN


(>89) 


  


  


 


 


Troponin I


  LESS THAN 0.02


NG/ML 


  


  


 


 


Anti-Nuclear Antibody Screen   POS (NEG)  


 


HDL Cholesterol


  


  


  


  63.9 MG/DL


(40.0-60.0)


 


Test


  1/13/18


05:50 


  


  


 


 


Prothrombin Time


  20.7 SEC


(9.8-11.6) 


  


  


 








Imaging





Last Impressions








Brain MRI 1/11/18 0852 Signed





Impressions: 





 Service Date/Time:  Thursday, January 11, 2018 11:10 - CONCLUSION:  1. No 

acute 





 hemorrhage, mass or infarction. 2. Mild atrophy and chronic small vessel 





 ischemic change.     Manolo Atkinson MD 


 


Head Magnetic Resonance Angiography 1/11/18 0000 Signed





Impressions: 





 Service Date/Time:  Thursday, January 11, 2018 11:10 - CONCLUSION:  1. 





 Unremarkable MRA examination of the brain. Specifically, no evidence for large 





 vessel occlusion or significant cerebral artery stenosis.     Hema Garcia MD 


 


Carotid Artery Ultrasound 1/11/18 0000 Signed





Impressions: 





 Service Date/Time:  Thursday, January 11, 2018 08:00 - CONCLUSION: Mild 





 calcification with no evidence of stenosis.     Manolo Atkinson MD 


 


Head CT 1/10/18 1500 Signed





Impressions: 





 Service Date/Time:  Wednesday, January 10, 2018 15:22 - CONCLUSION:  No acute 





 intracranial abnormality.     Kieran Graves MD 








PE at Discharge


GENERAL: This is a well-nourished, well-developed patient, in no apparent 

distress.


CARDIOVASCULAR: Regular rate and regular rhythm without murmurs, gallops, or 

rubs. 


RESPIRATORY: Clear to auscultation. Breath sounds equal bilaterally. No wheezes

, rales, or rhonchi.  


GASTROINTESTINAL: Abdomen soft, non-tender, nondistended. 


Normal, active bowel sounds


MUSCULOSKELETAL: Extremities without clubbing, cyanosis, or edema.


NEURO:  Alert & Oriented x4 to person, place, time, situation.  Moves all ext x4


Hospital Course


patient was admitted with possible TIA. imaging studies as noted above. patient 

was started back on her coumadin. she was seen and evaluated by cardiology and 

neurology. the course in the hospital was otherwise uneventful. she will be 

discharged home with f/u with her pcp, cardiology and neurology with PT/INR 

monitoring as outpatient.


Pt Condition on Discharge:  Stable


Discharge Disposition:  Discharge Home


Discharge Time:  <= 30 minutes


Discharge Instructions


DIET: Follow Instructions for:  Heart Healthy Diet, Coumadin (Warfarin) Diet


Speech Therapy-Diet Recommends:  Regular


Activities you can perform:  Regular-No Restrictions


Follow up Referrals:  


Cardiology - 1 Week with Kinjal Virk MD


Neurology - 1 Week with Jaden Holt MD


PCP Follow-up - 1 Week with Ramy Bernabe MD





Changed Medications:  


Warfarin (Warfarin) 2 Mg Tab


4 MG PO DAILY for Blood Clot Prevention, #30 TAB 0 Refills (Changed from: 2 MG)





 


Continued Medications:  


Acetaminophen Liq (Acetaminophen Extra Strength Liq) 500 Mg/15 Ml Soln


500 MG PO Q4-6H PRN for PAIN SCALE 6 TO 10, ML 0 Refills





Clonazepam (Clonazepam) 0.5 Mg Tab


0.5 MG PO HS, #60 TAB 0 Refills





Duloxetine DR (Duloxetine DR) 30 Mg Capdr


30 MG PO DAILY, #30 CAP 0 Refills





Multiple Vitamins W/ Minerals (Multiple Vitamin/Minerals) 1 Tab Tab








Pravastatin (Pravastatin) 20 Mg Tab


20 MG PO DAILY for Cholesterol Management, #30 TAB 0 Refills

















Devon Vance MD Jan 13, 2018 08:35

## 2018-01-15 LAB — ANA PATTERN: (no result)

## 2018-01-28 ENCOUNTER — HOSPITAL ENCOUNTER (EMERGENCY)
Dept: HOSPITAL 17 - PHEFT | Age: 83
Discharge: HOME | End: 2018-01-28
Payer: COMMERCIAL

## 2018-01-28 VITALS
TEMPERATURE: 98.3 F | OXYGEN SATURATION: 97 % | RESPIRATION RATE: 20 BRPM | DIASTOLIC BLOOD PRESSURE: 70 MMHG | SYSTOLIC BLOOD PRESSURE: 143 MMHG | HEART RATE: 73 BPM

## 2018-01-28 VITALS — BODY MASS INDEX: 21.78 KG/M2 | WEIGHT: 152.12 LBS | HEIGHT: 70 IN
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
## 2018-01-28 DIAGNOSIS — Z79.01: ICD-10-CM

## 2018-01-28 DIAGNOSIS — S13.4XXA: Primary | ICD-10-CM

## 2018-01-28 DIAGNOSIS — M47.9: ICD-10-CM

## 2018-01-28 DIAGNOSIS — S33.9XXA: ICD-10-CM

## 2018-01-28 DIAGNOSIS — F32.9: ICD-10-CM

## 2018-01-28 DIAGNOSIS — F41.9: ICD-10-CM

## 2018-01-28 DIAGNOSIS — Z86.73: ICD-10-CM

## 2018-01-28 PROCEDURE — 99282 EMERGENCY DEPT VISIT SF MDM: CPT
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
